# Patient Record
Sex: FEMALE | Race: WHITE | Employment: UNEMPLOYED | URBAN - METROPOLITAN AREA
[De-identification: names, ages, dates, MRNs, and addresses within clinical notes are randomized per-mention and may not be internally consistent; named-entity substitution may affect disease eponyms.]

---

## 2017-04-20 ENCOUNTER — HOSPITAL ENCOUNTER (EMERGENCY)
Age: 47
Discharge: HOME | End: 2017-04-20
Attending: FAMILY MEDICINE

## 2017-04-20 VITALS
OXYGEN SATURATION: 98 % | RESPIRATION RATE: 18 BRPM | HEART RATE: 94 BPM | HEIGHT: 65 IN | DIASTOLIC BLOOD PRESSURE: 83 MMHG | TEMPERATURE: 98.3 F | BODY MASS INDEX: 31.16 KG/M2 | SYSTOLIC BLOOD PRESSURE: 143 MMHG | WEIGHT: 187 LBS

## 2017-04-20 DIAGNOSIS — J20.9 ACUTE BRONCHITIS, UNSPECIFIED ORGANISM: Primary | ICD-10-CM

## 2017-04-20 RX ORDER — DOXYCYCLINE HYCLATE 100 MG
100 TABLET ORAL 2 TIMES DAILY
Qty: 20 TAB | Refills: 0 | Status: SHIPPED | OUTPATIENT
Start: 2017-04-20 | End: 2017-04-30

## 2017-04-20 RX ORDER — SUCRALFATE 1 G/1
1 TABLET ORAL 4 TIMES DAILY
COMMUNITY
End: 2017-09-01

## 2017-04-20 RX ORDER — CETIRIZINE HCL 10 MG
10 TABLET ORAL
COMMUNITY
End: 2017-09-01

## 2017-04-20 RX ORDER — BENZONATATE 200 MG/1
200 CAPSULE ORAL
Qty: 30 CAP | Refills: 0 | Status: SHIPPED | OUTPATIENT
Start: 2017-04-20 | End: 2017-09-01

## 2017-04-20 RX ORDER — PREDNISONE 20 MG/1
60 TABLET ORAL DAILY
Qty: 15 TAB | Refills: 0 | Status: SHIPPED | OUTPATIENT
Start: 2017-04-20 | End: 2017-04-25

## 2017-04-20 NOTE — UC PROVIDER NOTE
Patient is a 48 y.o. female presenting with cough. The history is provided by the patient. No  was used. Cough   This is a new problem. Episode onset: 5 days. The problem occurs constantly. The problem has been gradually worsening. The cough is non-productive. There has been no fever. Pertinent negatives include no chest pain, no chills, no sweats, no shortness of breath, no wheezing, no nausea and no vomiting. She has tried decongestants for the symptoms. The treatment provided no relief. She is a smoker. Her past medical history is significant for bronchitis. Her past medical history does not include pneumonia or asthma. Past Medical History:   Diagnosis Date    Arthritis     neck, back, hips & knees    Chronic fatigue     Colitis     Crohn's colitis ([address]     De Quervain's tenosynovitis, bilateral     Depression     Fibromyalgia     Patellofemoral syndrome of both knees     Pedal edema     Radial styloid fracture         Past Surgical History:   Procedure Laterality Date    HX WISDOM TEETH EXTRACTION      WI COLSC FLX W/NDSC US XM RCTM ET AL LMTD&ADJ STRUX           Family History   Problem Relation Age of Onset    Hypertension Mother     Cancer Father     Crohn's Disease Father     Crohn's Disease Brother     Cancer Paternal Aunt     Cancer Paternal Uncle         Social History     Social History    Marital status:      Spouse name: N/A    Number of children: N/A    Years of education: N/A     Occupational History    Not on file. Social History Main Topics    Smoking status: Current Every Day Smoker     Packs/day: 0.25     Years: 15.00    Smokeless tobacco: Never Used    Alcohol use No    Drug use: No    Sexual activity: Not on file     Other Topics Concern    Not on file     Social History Narrative                ALLERGIES: Review of patient's allergies indicates no known allergies. Review of Systems   Constitutional: Positive for fatigue. Negative for chills. HENT: Positive for congestion and sinus pressure. Eyes: Negative. Respiratory: Positive for cough. Negative for shortness of breath and wheezing. Cardiovascular: Negative. Negative for chest pain. Gastrointestinal: Negative. Negative for nausea and vomiting. Endocrine: Negative. Genitourinary: Negative. Musculoskeletal: Negative. Allergic/Immunologic: Negative. Neurological: Negative. Hematological: Negative. Psychiatric/Behavioral: Negative. Vitals:    04/20/17 1201   BP: 143/83   Pulse: 94   Resp: 18   Temp: 98.3 °F (36.8 °C)   SpO2: 98%   Weight: 84.8 kg (187 lb)   Height: 5' 4.5\" (1.638 m)       Physical Exam   Constitutional: She is oriented to person, place, and time. She appears well-developed and well-nourished. HENT:   Head: Normocephalic and atraumatic. Right Ear: External ear normal.   Left Ear: External ear normal.   Nose: Nose normal.   Mouth/Throat: Oropharynx is clear and moist.   Eyes: Conjunctivae and EOM are normal. Pupils are equal, round, and reactive to light. Neck: Normal range of motion. Neck supple. Cardiovascular: Normal rate, regular rhythm and normal heart sounds. Pulmonary/Chest: Effort normal and breath sounds normal. She has no wheezes. + bronchospastic cough   Musculoskeletal: Normal range of motion. Neurological: She is alert and oriented to person, place, and time. Skin: Skin is warm and dry. Psychiatric: She has a normal mood and affect. Her behavior is normal. Judgment and thought content normal.   Nursing note and vitals reviewed. MDM     Differential Diagnosis; Clinical Impression; Plan:     CLINICAL IMPRESSION:  Acute bronchitis, unspecified organism  (primary encounter diagnosis)    Plan:  1. Bronchitis- stop smoking  2. Take all prescribed medications as directed  3.  Follow up with PCP if no improvement  Risk of Significant Complications, Morbidity, and/or Mortality:   Presenting problems: Moderate  Diagnostic procedures:   Moderate  Progress:   Patient progress:  Stable      Procedures

## 2017-04-20 NOTE — DISCHARGE INSTRUCTIONS
Bronchitis: Care Instructions  Your Care Instructions    Bronchitis is inflammation of the bronchial tubes, which carry air to the lungs. The tubes swell and produce mucus, or phlegm. The mucus and inflamed bronchial tubes make you cough. You may have trouble breathing. Most cases of bronchitis are caused by viruses like those that cause colds. Antibiotics usually do not help and they may be harmful. Bronchitis usually develops rapidly and lasts about 2 to 3 weeks in otherwise healthy people. Follow-up care is a key part of your treatment and safety. Be sure to make and go to all appointments, and call your doctor if you are having problems. It's also a good idea to know your test results and keep a list of the medicines you take. How can you care for yourself at home? · Take all medicines exactly as prescribed. Call your doctor if you think you are having a problem with your medicine. · Get some extra rest.  · Take an over-the-counter pain medicine, such as acetaminophen (Tylenol), ibuprofen (Advil, Motrin), or naproxen (Aleve) to reduce fever and relieve body aches. Read and follow all instructions on the label. · Do not take two or more pain medicines at the same time unless the doctor told you to. Many pain medicines have acetaminophen, which is Tylenol. Too much acetaminophen (Tylenol) can be harmful. · Take an over-the-counter cough medicine that contains dextromethorphan to help quiet a dry, hacking cough so that you can sleep. Avoid cough medicines that have more than one active ingredient. Read and follow all instructions on the label. · Breathe moist air from a humidifier, hot shower, or sink filled with hot water. The heat and moisture will thin mucus so you can cough it out. · Do not smoke. Smoking can make bronchitis worse. If you need help quitting, talk to your doctor about stop-smoking programs and medicines. These can increase your chances of quitting for good.   When should you call for help? Call 911 anytime you think you may need emergency care. For example, call if:  · You have severe trouble breathing. Call your doctor now or seek immediate medical care if:  · You have new or worse trouble breathing. · You cough up dark brown or bloody mucus (sputum). · You have a new or higher fever. · You have a new rash. Watch closely for changes in your health, and be sure to contact your doctor if:  · You cough more deeply or more often, especially if you notice more mucus or a change in the color of your mucus. · You are not getting better as expected. Where can you learn more? Go to http://lamonte-maría.info/. Enter H333 in the search box to learn more about \"Bronchitis: Care Instructions. \"  Current as of: May 23, 2016  Content Version: 11.2  © 2772-1398 [email or web address]. Care instructions adapted under license by [employer] (which disclaims liability or warranty for this information). If you have questions about a medical condition or this instruction, always ask your healthcare professional. [address] any warranty or liability for your use of this information.

## 2017-07-18 ENCOUNTER — APPOINTMENT (OUTPATIENT)
Dept: CT IMAGING | Age: 47
End: 2017-07-18
Attending: PHYSICIAN ASSISTANT
Payer: MEDICAID

## 2017-07-18 ENCOUNTER — HOSPITAL ENCOUNTER (EMERGENCY)
Age: 47
Discharge: HOME | End: 2017-07-18
Attending: EMERGENCY MEDICINE
Payer: MEDICAID

## 2017-07-18 VITALS
HEART RATE: 97 BPM | SYSTOLIC BLOOD PRESSURE: 143 MMHG | BODY MASS INDEX: 31.28 KG/M2 | WEIGHT: 183.2 LBS | DIASTOLIC BLOOD PRESSURE: 83 MMHG | TEMPERATURE: 98.9 F | RESPIRATION RATE: 18 BRPM | HEIGHT: 64 IN | OXYGEN SATURATION: 97 %

## 2017-07-18 DIAGNOSIS — K52.9 INFLAMMATORY BOWEL DISEASE: ICD-10-CM

## 2017-07-18 DIAGNOSIS — N30.01 ACUTE CYSTITIS WITH HEMATURIA: ICD-10-CM

## 2017-07-18 DIAGNOSIS — R10.84 ABDOMINAL PAIN, GENERALIZED: Primary | ICD-10-CM

## 2017-07-18 LAB
ALBUMIN SERPL BCP-MCNC: 3.3 G/DL (ref 3.5–5)
ALBUMIN/GLOB SERPL: 0.8 {RATIO} (ref 1.1–2.2)
ALP SERPL-CCNC: 89 U/L (ref 45–117)
ALT SERPL-CCNC: 16 U/L (ref 12–78)
ANION GAP BLD CALC-SCNC: 3 MMOL/L (ref 5–15)
APPEARANCE UR: CLEAR
AST SERPL W P-5'-P-CCNC: 10 U/L (ref 15–37)
BACTERIA URNS QL MICRO: ABNORMAL /HPF
BASOPHILS # BLD AUTO: 0 K/UL (ref 0–0.1)
BASOPHILS # BLD: 0 % (ref 0–1)
BILIRUB SERPL-MCNC: 0.3 MG/DL (ref 0.2–1)
BILIRUB UR QL: NEGATIVE
BUN SERPL-MCNC: 9 MG/DL (ref 6–20)
BUN/CREAT SERPL: 11 (ref 12–20)
CALCIUM SERPL-MCNC: 8.6 MG/DL (ref 8.5–10.1)
CHLORIDE SERPL-SCNC: 104 MMOL/L (ref 97–108)
CO2 SERPL-SCNC: 29 MMOL/L (ref 21–32)
COLOR UR: ABNORMAL
CREAT SERPL-MCNC: 0.84 MG/DL (ref 0.55–1.02)
EOSINOPHIL # BLD: 0.2 K/UL (ref 0–0.4)
EOSINOPHIL NFR BLD: 1 % (ref 0–7)
EPITH CASTS URNS QL MICRO: ABNORMAL /LPF
ERYTHROCYTE [DISTWIDTH] IN BLOOD BY AUTOMATED COUNT: 12.4 % (ref 11.5–14.5)
GLOBULIN SER CALC-MCNC: 4.3 G/DL (ref 2–4)
GLUCOSE SERPL-MCNC: 80 MG/DL (ref 65–100)
GLUCOSE UR STRIP.AUTO-MCNC: NEGATIVE MG/DL
HCT VFR BLD AUTO: 37.7 % (ref 35–47)
HEMOCCULT STL QL: NEGATIVE
HGB BLD-MCNC: 13.2 G/DL (ref 11.5–16)
HGB UR QL STRIP: ABNORMAL
KETONES UR QL STRIP.AUTO: NEGATIVE MG/DL
LEUKOCYTE ESTERASE UR QL STRIP.AUTO: ABNORMAL
LYMPHOCYTES # BLD AUTO: 20 % (ref 12–49)
LYMPHOCYTES # BLD: 2.5 K/UL (ref 0.8–3.5)
MCH RBC QN AUTO: 30.3 PG (ref 26–34)
MCHC RBC AUTO-ENTMCNC: 35 G/DL (ref 30–36.5)
MCV RBC AUTO: 86.5 FL (ref 80–99)
MONOCYTES # BLD: 0.6 K/UL (ref 0–1)
MONOCYTES NFR BLD AUTO: 5 % (ref 5–13)
NEUTS SEG # BLD: 9.2 K/UL (ref 1.8–8)
NEUTS SEG NFR BLD AUTO: 74 % (ref 32–75)
NITRITE UR QL STRIP.AUTO: NEGATIVE
PH UR STRIP: 5.5 [PH] (ref 5–8)
PLATELET # BLD AUTO: 327 K/UL (ref 150–400)
POTASSIUM SERPL-SCNC: 3.4 MMOL/L (ref 3.5–5.1)
PROT SERPL-MCNC: 7.6 G/DL (ref 6.4–8.2)
PROT UR STRIP-MCNC: NEGATIVE MG/DL
RBC # BLD AUTO: 4.36 M/UL (ref 3.8–5.2)
RBC #/AREA URNS HPF: ABNORMAL /HPF (ref 0–5)
SODIUM SERPL-SCNC: 136 MMOL/L (ref 136–145)
SP GR UR REFRACTOMETRY: 1 (ref 1–1.03)
UA: UC IF INDICATED,UAUC: ABNORMAL
UROBILINOGEN UR QL STRIP.AUTO: 0.2 EU/DL (ref 0.2–1)
WBC # BLD AUTO: 12.5 K/UL (ref 3.6–11)
WBC URNS QL MICRO: ABNORMAL /HPF (ref 0–4)

## 2017-07-18 PROCEDURE — 74177 CT ABD & PELVIS W/CONTRAST: CPT

## 2017-07-18 PROCEDURE — 74011636320 HC RX REV CODE- 636/320: Performed by: PHYSICIAN ASSISTANT

## 2017-07-18 PROCEDURE — 96375 TX/PRO/DX INJ NEW DRUG ADDON: CPT

## 2017-07-18 PROCEDURE — 85025 COMPLETE CBC W/AUTO DIFF WBC: CPT

## 2017-07-18 PROCEDURE — 74011250636 HC RX REV CODE- 250/636: Performed by: EMERGENCY MEDICINE

## 2017-07-18 PROCEDURE — 74011250637 HC RX REV CODE- 250/637: Performed by: PHYSICIAN ASSISTANT

## 2017-07-18 PROCEDURE — 74011636320 HC RX REV CODE- 636/320: Performed by: EMERGENCY MEDICINE

## 2017-07-18 PROCEDURE — 36415 COLL VENOUS BLD VENIPUNCTURE: CPT

## 2017-07-18 PROCEDURE — 80053 COMPREHEN METABOLIC PANEL: CPT

## 2017-07-18 PROCEDURE — 99283 EMERGENCY DEPT VISIT LOW MDM: CPT

## 2017-07-18 PROCEDURE — 82272 OCCULT BLD FECES 1-3 TESTS: CPT

## 2017-07-18 PROCEDURE — 81001 URINALYSIS AUTO W/SCOPE: CPT

## 2017-07-18 PROCEDURE — 96374 THER/PROPH/DIAG INJ IV PUSH: CPT

## 2017-07-18 PROCEDURE — 87086 URINE CULTURE/COLONY COUNT: CPT

## 2017-07-18 PROCEDURE — 74011250636 HC RX REV CODE- 250/636: Performed by: PHYSICIAN ASSISTANT

## 2017-07-18 RX ORDER — DIPHENHYDRAMINE HYDROCHLORIDE 50 MG/ML
25 INJECTION, SOLUTION INTRAMUSCULAR; INTRAVENOUS
Status: COMPLETED | OUTPATIENT
Start: 2017-07-18 | End: 2017-07-18

## 2017-07-18 RX ORDER — ONDANSETRON 4 MG/1
4 TABLET, ORALLY DISINTEGRATING ORAL
Qty: 10 TAB | Refills: 0 | Status: SHIPPED | OUTPATIENT
Start: 2017-07-18 | End: 2017-09-01

## 2017-07-18 RX ORDER — CEPHALEXIN 250 MG/1
500 CAPSULE ORAL
Status: COMPLETED | OUTPATIENT
Start: 2017-07-18 | End: 2017-07-18

## 2017-07-18 RX ORDER — KETOROLAC TROMETHAMINE 30 MG/ML
30 INJECTION, SOLUTION INTRAMUSCULAR; INTRAVENOUS
Status: COMPLETED | OUTPATIENT
Start: 2017-07-18 | End: 2017-07-18

## 2017-07-18 RX ORDER — METOCLOPRAMIDE HYDROCHLORIDE 5 MG/ML
10 INJECTION INTRAMUSCULAR; INTRAVENOUS
Status: COMPLETED | OUTPATIENT
Start: 2017-07-18 | End: 2017-07-18

## 2017-07-18 RX ORDER — SODIUM CHLORIDE 9 MG/ML
50 INJECTION, SOLUTION INTRAVENOUS
Status: COMPLETED | OUTPATIENT
Start: 2017-07-18 | End: 2017-07-18

## 2017-07-18 RX ORDER — ONDANSETRON 2 MG/ML
4 INJECTION INTRAMUSCULAR; INTRAVENOUS ONCE
Status: COMPLETED | OUTPATIENT
Start: 2017-07-18 | End: 2017-07-18

## 2017-07-18 RX ORDER — CEPHALEXIN 500 MG/1
500 CAPSULE ORAL 3 TIMES DAILY
Qty: 21 CAP | Refills: 0 | Status: SHIPPED | OUTPATIENT
Start: 2017-07-18 | End: 2017-07-25

## 2017-07-18 RX ORDER — METOCLOPRAMIDE 10 MG/1
10 TABLET ORAL
Qty: 12 TAB | Refills: 0 | Status: SHIPPED | OUTPATIENT
Start: 2017-07-18 | End: 2017-07-28

## 2017-07-18 RX ORDER — PHENAZOPYRIDINE HYDROCHLORIDE 200 MG/1
200 TABLET, FILM COATED ORAL
Qty: 21 TAB | Refills: 0 | Status: SHIPPED | OUTPATIENT
Start: 2017-07-18 | End: 2017-09-01

## 2017-07-18 RX ORDER — SODIUM CHLORIDE 0.9 % (FLUSH) 0.9 %
10 SYRINGE (ML) INJECTION
Status: COMPLETED | OUTPATIENT
Start: 2017-07-18 | End: 2017-07-18

## 2017-07-18 RX ADMIN — Medication 10 ML: at 14:50

## 2017-07-18 RX ADMIN — IOPAMIDOL 100 ML: 755 INJECTION, SOLUTION INTRAVENOUS at 14:50

## 2017-07-18 RX ADMIN — CEPHALEXIN 500 MG: 250 CAPSULE ORAL at 17:02

## 2017-07-18 RX ADMIN — DIATRIZOATE MEGLUMINE AND DIATRIZOATE SODIUM 30 ML: 600; 100 SOLUTION ORAL; RECTAL at 13:30

## 2017-07-18 RX ADMIN — KETOROLAC TROMETHAMINE 30 MG: 30 INJECTION, SOLUTION INTRAMUSCULAR at 13:19

## 2017-07-18 RX ADMIN — METOCLOPRAMIDE 10 MG: 5 INJECTION, SOLUTION INTRAMUSCULAR; INTRAVENOUS at 13:19

## 2017-07-18 RX ADMIN — SODIUM CHLORIDE 50 ML/HR: 900 INJECTION, SOLUTION INTRAVENOUS at 14:50

## 2017-07-18 RX ADMIN — DIPHENHYDRAMINE HYDROCHLORIDE 25 MG: 50 INJECTION, SOLUTION INTRAMUSCULAR; INTRAVENOUS at 13:19

## 2017-07-18 RX ADMIN — ONDANSETRON 4 MG: 2 INJECTION INTRAMUSCULAR; INTRAVENOUS at 13:20

## 2017-07-18 NOTE — ED PROVIDER NOTES
HPI Comments: Flor Marquis is a 48 y.o. female with a PMH of ulcerative colitis and Crohn's disease presenting ambulatory to the ED from home C/O of left mid to lower abdominal pain x 3 weeks with associated nausea, diarrhea, and weight loss of 10 lbs over the last 2.5 weeks. Pt also complains of spasms in her left arm since last night and notes having vaginal discharge that \"looks like stool, but not for a couple of days\". She describes her stool as yellow with slight blood, and states she has 12-15 episodes of diarrhea per day. Her last BM was about half an hour ago. Pt states that this is her third week of her colitis flare up and had been seen at [facility] ED 4 days ago, but \"wasn't treated for anything\". She also states that her flare ups can range from about a couple of days to months and have \"almost killed (her) in the past\". She reports additional symptoms of chest wall pain, noting a prior hx of costochondritis for which current symptom is similar. Pt has not had any other recent abdominal surgeries, travel, abx, hospitalizations, or injuries. She specifically denies any vomiting, dysuria, hematuria, rashes or vaginal bleeding. Patient denies any other symptoms or complaints. PCP: Nadeem Campoverde MD  GI: Gaby Dewitt MD    There are no other complaints, changes or physical findings at this time. The history is provided by the patient. No  was used.         Past Medical History:   Diagnosis Date    Arthritis     neck, back, hips & knees    Chronic fatigue     Colitis     Crohn's colitis ([address]     De Quervain's tenosynovitis, bilateral     Depression     Fibromyalgia     Patellofemoral syndrome of both knees     Pedal edema     Radial styloid fracture        Past Surgical History:   Procedure Laterality Date    HX WISDOM TEETH EXTRACTION      VT COLSC FLX W/NDSC US XM RCTM ET AL LMTD&ADJ STRUX           Family History:   Problem Relation Age of Onset    Hypertension Mother     Cancer Father     Crohn's Disease Father     Crohn's Disease Brother     Cancer Paternal Aunt     Cancer Paternal Uncle        Social History     Social History    Marital status:      Spouse name: N/A    Number of children: N/A    Years of education: N/A     Occupational History    Not on file. Social History Main Topics    Smoking status: Current Every Day Smoker     Packs/day: 0.25     Years: 15.00    Smokeless tobacco: Never Used    Alcohol use No    Drug use: No    Sexual activity: Not on file     Other Topics Concern    Not on file     Social History Narrative         ALLERGIES: Review of patient's allergies indicates no known allergies. Review of Systems   Constitutional: Positive for unexpected weight change (weight loss). Negative for chills and fever. HENT: Negative for sore throat. Eyes: Negative for pain. Respiratory: Negative for cough and shortness of breath. Cardiovascular: Positive for chest pain (occasional, no recent change). Gastrointestinal: Positive for blood in stool, diarrhea and nausea. Negative for abdominal pain and vomiting. Genitourinary: Negative for dysuria and hematuria. Musculoskeletal: Negative for arthralgias and myalgias. Skin: Negative for rash. Neurological: Negative for dizziness, light-headedness, numbness and headaches. Psychiatric/Behavioral: Negative for behavioral problems and confusion. Physical Exam   Constitutional: She is oriented to person, place, and time. She appears well-developed and well-nourished. No distress. HENT:   Head: Normocephalic and atraumatic. Right Ear: External ear normal.   Left Ear: External ear normal.   Nose: Nose normal.   Eyes: Conjunctivae and EOM are normal.   Neck: Normal range of motion. Neck supple. Cardiovascular: Normal rate, regular rhythm, normal heart sounds and intact distal pulses. No murmur heard.   Pulmonary/Chest: Effort normal and breath sounds normal. She has no decreased breath sounds. She has no wheezes. Abdominal: Soft. Bowel sounds are normal. She exhibits no distension. There is tenderness in the right upper quadrant, epigastric area and suprapubic area. There is no guarding and no CVA tenderness (bilaterally). Musculoskeletal: Normal range of motion. She exhibits no edema or tenderness. Neurological: She is alert and oriented to person, place, and time. Skin: Skin is warm and dry. No rash noted. She is not diaphoretic. Psychiatric: She has a normal mood and affect. Her behavior is normal. Judgment normal.   Nursing note and vitals reviewed. MDM  Number of Diagnoses or Management Options  Abdominal pain, generalized:   Acute cystitis with hematuria:   Inflammatory bowel disease:   Diagnosis management comments: DDx: gastritis, gastroenteritis, diverticulitis, diverticulosis, ulcerative colitis flare, Crohn's disease, acute blood loss, anemia. Patient presents with diarrhea and abdominal pain for the past 2 weeks with history of UC/Crohns disease. Followed by Dr. Pau Donis (GI) with [facility]. Slight UTI found on lab work. Inflammation of the colon with no acute obstruction or abscess. Will discharge home and recommend close f/u with GI doctor. Amount and/or Complexity of Data Reviewed  Clinical lab tests: ordered and reviewed  Tests in the radiology section of CPT®: ordered and reviewed  Review and summarize past medical records: yes    Patient Progress  Patient progress: stable    ED Course       Procedures    Chief Complaint   Patient presents with    Abdominal Pain     left mid to lower denies vomiting    Headache     few days    Diarrhea     several days    Hand Pain     left hand has a small knot, unsure what caused knot. denies any injury       12:25 PM  The patients presenting problems have been discussed, and they are in agreement with the care plan formulated and outlined with them.   I have encouraged them to ask questions as they arise throughout their visit. MEDICATIONS GIVEN:  Medications   sodium chloride 0.9 % bolus infusion 1,000 mL (not administered)   cephALEXin (KEFLEX) capsule 500 mg (not administered)   ondansetron (ZOFRAN) injection 4 mg (4 mg IntraVENous Given 7/18/17 1320)   metoclopramide HCl (REGLAN) injection 10 mg (10 mg IntraVENous Given 7/18/17 1319)   diphenhydrAMINE (BENADRYL) injection 25 mg (25 mg IntraVENous Given 7/18/17 1319)   ketorolac (TORADOL) injection 30 mg (30 mg IntraVENous Given 7/18/17 1319)   diatrizoate meglumine-d.sodium (MD-GASTROVIEW,GASTROGRAFIN) 66-10 % contrast solution 30 mL (30 mL Oral Given 7/18/17 1330)   0.9% sodium chloride infusion (50 mL/hr IntraVENous New Bag 7/18/17 1450)   iopamidol (ISOVUE-370) 76 % injection 100 mL (100 mL IntraVENous Given 7/18/17 1450)   sodium chloride (NS) flush 10 mL (10 mL IntraVENous Given 7/18/17 1450)       LABS REVIEWED:  Recent Results (from the past 24 hour(s))   CBC WITH AUTOMATED DIFF    Collection Time: 07/18/17  1:00 PM   Result Value Ref Range    WBC 12.5 (H) 3.6 - 11.0 K/uL    RBC 4.36 3.80 - 5.20 M/uL    HGB 13.2 11.5 - 16.0 g/dL    HCT 37.7 35.0 - 47.0 %    MCV 86.5 80.0 - 99.0 FL    MCH 30.3 26.0 - 34.0 PG    MCHC 35.0 30.0 - 36.5 g/dL    RDW 12.4 11.5 - 14.5 %    PLATELET 148 657 - 195 K/uL    NEUTROPHILS 74 32 - 75 %    LYMPHOCYTES 20 12 - 49 %    MONOCYTES 5 5 - 13 %    EOSINOPHILS 1 0 - 7 %    BASOPHILS 0 0 - 1 %    ABS. NEUTROPHILS 9.2 (H) 1.8 - 8.0 K/UL    ABS. LYMPHOCYTES 2.5 0.8 - 3.5 K/UL    ABS. MONOCYTES 0.6 0.0 - 1.0 K/UL    ABS. EOSINOPHILS 0.2 0.0 - 0.4 K/UL    ABS.  BASOPHILS 0.0 0.0 - 0.1 K/UL   METABOLIC PANEL, COMPREHENSIVE    Collection Time: 07/18/17  1:00 PM   Result Value Ref Range    Sodium 136 136 - 145 mmol/L    Potassium 3.4 (L) 3.5 - 5.1 mmol/L    Chloride 104 97 - 108 mmol/L    CO2 29 21 - 32 mmol/L    Anion gap 3 (L) 5 - 15 mmol/L    Glucose 80 65 - 100 mg/dL    BUN 9 6 - 20 MG/DL Creatinine 0.84 0.55 - 1.02 MG/DL    BUN/Creatinine ratio 11 (L) 12 - 20      GFR est AA >60 >60 ml/min/1.73m2    GFR est non-AA >60 >60 ml/min/1.73m2    Calcium 8.6 8.5 - 10.1 MG/DL    Bilirubin, total 0.3 0.2 - 1.0 MG/DL    ALT (SGPT) 16 12 - 78 U/L    AST (SGOT) 10 (L) 15 - 37 U/L    Alk. phosphatase 89 45 - 117 U/L    Protein, total 7.6 6.4 - 8.2 g/dL    Albumin 3.3 (L) 3.5 - 5.0 g/dL    Globulin 4.3 (H) 2.0 - 4.0 g/dL    A-G Ratio 0.8 (L) 1.1 - 2.2     OCCULT BLOOD, STOOL    Collection Time: 07/18/17  1:25 PM   Result Value Ref Range    Occult blood, stool NEGATIVE  NEG     URINALYSIS W/ REFLEX CULTURE    Collection Time: 07/18/17  3:38 PM   Result Value Ref Range    Color YELLOW/STRAW      Appearance CLEAR CLEAR      Specific gravity 1.005 1.003 - 1.030      pH (UA) 5.5 5.0 - 8.0      Protein NEGATIVE  NEG mg/dL    Glucose NEGATIVE  NEG mg/dL    Ketone NEGATIVE  NEG mg/dL    Bilirubin NEGATIVE  NEG      Blood TRACE (A) NEG      Urobilinogen 0.2 0.2 - 1.0 EU/dL    Nitrites NEGATIVE  NEG      Leukocyte Esterase SMALL (A) NEG      WBC 20-50 0 - 4 /hpf    RBC 0-5 0 - 5 /hpf    Epithelial cells FEW FEW /lpf    Bacteria 1+ (A) NEG /hpf    UA:UC IF INDICATED URINE CULTURE ORDERED (A) CNI         VITAL SIGNS:  Patient Vitals for the past 12 hrs:   Temp Pulse Resp BP SpO2   07/18/17 1210 98.9 °F (37.2 °C) 97 18 143/83 97 %       RADIOLOGY RESULTS:  The following have been ordered and reviewed:    CT Results  (Last 48 hours)               07/18/17 1451  CT ABD PELV W CONT Final result    Impression:  IMPRESSION:        Thickening of the wall of the colon extending from the proximal transverse colon   through the rectum, consistent with the patient's history of inflammatory bowel   disease and colitis. No evidence of bowel obstruction. No abscess. .               Narrative:  EXAM: CT ABDOMEN PELVIS WITH CONTRAST   INDICATION:  Abdominal pain, history of Crohn's/colitis.    COMPARISON: No prior imaging studies of the abdomen. .   CONTRAST: 100 mL of Isovue-370. TECHNIQUE:    Multislice helical CT was performed from the diaphragm to the symphysis pubis   during uneventful rapid bolus intravenous contrast administration. Oral contrast   was not administered. Contiguous 5 mm axial images were reconstructed and lung   and soft tissue windows were generated. Coronal and sagittal reformations were   generated. CT dose reduction was achieved through use of a standardized protocol   tailored for this examination and automatic exposure control for dose   modulation. FINDINGS:   LOWER CHEST: The visualized portions of the lung bases are clear. ABDOMEN:   Liver: No focal lesions. Normal size and contour. Gallbladder and bile ducts: No gallstones. No biliary dilatation. Spleen: No abnormality. Pancreas: No abnormality. Adrenal glands: No abnormality. Kidneys: No abnormality. PELVIS:   Reproductive organs:  Unremarkable. Bladder: No abnormality. BOWEL AND MESENTERY: Stomach appears unremarkable. No small bowel   abnormalities. Diffuse thickening of the wall of the colon extending from the rectum to the   proximal transverse colon, consistent with the patient's history of inflammatory   bowel disease. Normal appearance of the descending colon and cecum. No   significant abnormalities of the terminal ileum. No evidence of bowel   obstruction or adjacent fluid collection. Mild stranding of the pericolic fat   particularly adjacent to the descending colon. No mesenteric mass or adenopathy. Appendix is nondistended. PERITONEUM: No ascites or free intraperitoneal air. RETROPERITONEUM: Aorta  tapers without aneurysm. No retroperitoneal adenopathy   or mass. No pelvic mass or adenopathy. BONES AND SOFT TISSUES: Sclerosis of the SI joints, right greater than left,   particularly on the iliac side, but with some involvement of the right posterior   sacrum.  Sacroiliitis versus osteitis condensans ilii. Bones otherwise   unremarkable. PROCEDURES:  Procedure Note - Rectal Exam:   12:39 PM  Performed by: Sheryl Yanes PA-C  Chaperoned by: Amadeo Dillon RN  Rectal exam performed. Light green stool was collected. Hemoccult card was sent to the lab to be tested  The procedure took 1-15 minutes, and pt tolerated well. Written by THOMAS Saldana, as dictated by Sheryl Yanes PA-C. PROGRESS NOTES:  12:25 PM  Initial assessment of patient complete, and patient was given the opportunity to ask questions. Plan of care reviewed with patient and will update when results are available. 4:45 PM  I have just reevaluated the patient. I have reviewed her vital signs and determined there is currently no worsening in their condition or physical exam. Results have been reviewed with them and their questions have been answered. 4:53 PM  I have reviewed discharge instructions with the patient and explained medications that she is being discharged with. The patient verbalized understanding and agrees with plan. DIAGNOSIS:    1. Abdominal pain, generalized    2. Inflammatory bowel disease    3. Acute cystitis with hematuria        PLAN:  1. Discharge home  2. Medications as directed  3. F/u with PCP in 2-3 days  4. Return precautions reviewed    Follow-up Information     Follow up With Details Comments Contact Info    Jason Schreiber MD Schedule an appointment as soon as possible for a visit      [facility]  As needed, If symptoms worsen [address] [place] [address]  [phone]        Current Discharge Medication List      START taking these medications    Details   ondansetron (ZOFRAN ODT) 4 mg disintegrating tablet Take 1 Tab by mouth every eight (8) hours as needed for Nausea. Qty: 10 Tab, Refills: 0      metoclopramide HCl (REGLAN) 10 mg tablet Take 1 Tab by mouth every six (6) hours as needed for Nausea for up to 10 days.   Qty: 12 Tab, Refills: 0      cephALEXin (KEFLEX) 500 mg capsule Take 1 Cap by mouth three (3) times daily for 7 days. Qty: 21 Cap, Refills: 0      phenazopyridine (PYRIDIUM) 200 mg tablet Take 1 Tab by mouth three (3) times daily as needed for Pain for up to 7 doses. Qty: 21 Tab, Refills: 0               ED COURSE: The patients hospital course has been uncomplicated. DISCHARGE NOTE:  4:55 PM  The care plan has been outline with the patient and/or family, who verbally conveyed understanding and agreement. Available results have been reviewed. Patient and/or family understand the follow up plan as outlined and discharge instructions. Should their condition deterioration at any time after discharge the patient agrees to return, follow up sooner than outlined or seek medical assistance at the closest Emergency Room as soon as possible. Questions have been answered.  Discharge instructions and educational information regarding the patient's diagnosis as well a list of reasons why the patient would want to seek immediate medical attention, should their condition change, were reviewed directly with the patient/family         This note will not be viewable in [facility]

## 2017-07-18 NOTE — DISCHARGE INSTRUCTIONS
Thank you for allowing us to provide you with excellent care today. We hope we addressed all of your concerns and needs. We strive to provide excellent quality care in the Emergency Department. Please rate us as excellent, as anything less than excellent does not meet our expectations. If you feel that you have not received excellent quality care or timely care, please ask to speak to the nurse manager. Please choose us in the future for your continued health care needs. The exam and treatment you received in the Emergency Department were for an urgent problem and are not intended as complete care. It is important that you follow-up with a doctor, nurse practitioner, or physician assistant to:  (1) confirm your diagnosis,  (2) re-evaluation of changes in your illness and treatment, and  (3) for ongoing care. If your symptoms become worse or you do not improve as expected and you are unable to reach your usual health care provider, you should return to the Emergency Department. We are available 24 hours a day. Take this sheet with you when you go to your follow-up visit. If you have any problem arranging the follow-up visit, contact [address] [place] [address] 21 [phone])    Make an appointment with your Primary Care doctor for follow up of this visit. Return to the ER if you are unable to be seen in the time recommended on your discharge instructions. Abdominal Pain: Care Instructions  Your Care Instructions    Abdominal pain has many possible causes. Some aren't serious and get better on their own in a few days. Others need more testing and treatment. If your pain continues or gets worse, you need to be rechecked and may need more tests to find out what is wrong. You may need surgery to correct the problem. Don't ignore new symptoms, such as fever, nausea and vomiting, urination problems, pain that gets worse, and dizziness. These may be signs of a more serious problem.   Your doctor may have recommended a follow-up visit in the next 8 to 12 hours. If you are not getting better, you may need more tests or treatment. The doctor has checked you carefully, but problems can develop later. If you notice any problems or new symptoms, get medical treatment right away. Follow-up care is a key part of your treatment and safety. Be sure to make and go to all appointments, and call your doctor if you are having problems. It's also a good idea to know your test results and keep a list of the medicines you take. How can you care for yourself at home? · Rest until you feel better. · To prevent dehydration, drink plenty of fluids, enough so that your urine is light yellow or clear like water. Choose water and other caffeine-free clear liquids until you feel better. If you have kidney, heart, or liver disease and have to limit fluids, talk with your doctor before you increase the amount of fluids you drink. · If your stomach is upset, eat mild foods, such as rice, dry toast or crackers, bananas, and applesauce. Try eating several small meals instead of two or three large ones. · Wait until 48 hours after all symptoms have gone away before you have spicy foods, alcohol, and drinks that contain caffeine. · Do not eat foods that are high in fat. · Avoid anti-inflammatory medicines such as aspirin, ibuprofen (Advil, Motrin), and naproxen (Aleve). These can cause stomach upset. Talk to your doctor if you take daily aspirin for another health problem. When should you call for help? Call 911 anytime you think you may need emergency care. For example, call if:  · You passed out (lost consciousness). · You pass maroon or very bloody stools. · You vomit blood or what looks like coffee grounds. · You have new, severe belly pain. Call your doctor now or seek immediate medical care if:  · Your pain gets worse, especially if it becomes focused in one area of your belly. · You have a new or higher fever.   · Your stools are black and look like tar, or they have streaks of blood. · You have unexpected vaginal bleeding. · You have symptoms of a urinary tract infection. These may include:  ¨ Pain when you urinate. ¨ Urinating more often than usual.  ¨ Blood in your urine. · You are dizzy or lightheaded, or you feel like you may faint. Watch closely for changes in your health, and be sure to contact your doctor if:  · You are not getting better after 1 day (24 hours). Where can you learn more? Go to http://lamonte-maría.info/. Enter J119 in the search box to learn more about \"Abdominal Pain: Care Instructions. \"  Current as of: March 20, 2017  Content Version: 11.3  © 8848-4269 [employer]. Care instructions adapted under license by [employer] (which disclaims liability or warranty for this information). If you have questions about a medical condition or this instruction, always ask your healthcare professional. [place] [address] any warranty or liability for your use of this information.

## 2017-07-20 LAB
BACTERIA SPEC CULT: NORMAL
CC UR VC: NORMAL
SERVICE CMNT-IMP: NORMAL

## 2017-09-01 ENCOUNTER — OFFICE VISIT (OUTPATIENT)
Dept: RHEUMATOLOGY | Age: 47
End: 2017-09-01

## 2017-09-01 VITALS
BODY MASS INDEX: 30.56 KG/M2 | RESPIRATION RATE: 18 BRPM | SYSTOLIC BLOOD PRESSURE: 156 MMHG | DIASTOLIC BLOOD PRESSURE: 101 MMHG | HEART RATE: 93 BPM | TEMPERATURE: 98.2 F | HEIGHT: 64 IN | WEIGHT: 179 LBS

## 2017-09-01 DIAGNOSIS — I10 ESSENTIAL HYPERTENSION: ICD-10-CM

## 2017-09-01 DIAGNOSIS — M79.7 FIBROMYALGIA: Primary | ICD-10-CM

## 2017-09-01 DIAGNOSIS — K52.9 INFLAMMATORY BOWEL DISEASES (IBD): ICD-10-CM

## 2017-09-01 DIAGNOSIS — M85.38 OSTEITIS CONDENSANS ILII: ICD-10-CM

## 2017-09-01 DIAGNOSIS — Z72.0 TOBACCO USE: ICD-10-CM

## 2017-09-01 NOTE — PATIENT INSTRUCTIONS
FIBROMYALGIA    Fibromyalgia is a disease characterized by chronic widespread musculoskeletal pain. Fibromyalgia is caused by abnormal processing of pain signals in the central nervous system, leading to exaggerated pain responses. There are functional MRI studies that have shown neuroplasticity (re-wiring) of the pain pathways in the brain. Physical and Mental Exercise    The mainstay of therapy includes non-pharmacologic therapies such as cardiovascular exercise and Cognitive Behavioral Therapy which have been shown to be of benefit ([facility], Am J Med 2009). Juan Jose Chi in particular has proven efficacy in the treatment of fibromyalgia Radha Che al. [facility] 2010; [phone]). Performing at least 60 minutes per day of Marsha Dukes has been shown to improve pain without medical management. Medications    After discussing with your primary care and if medications are pursued, pregabalin (Lyrica), gabapentin (Neurontin), milnacipran (Carmie Schooling), and duloxetine (Cymbalta) are FDA approved medications for the treatment of fibromyalgia. Narcotic Pain Medications Are BAD    Narcotics, such as oxycodone, hydrocodone, morphine, dilaudid, or codeine, have not been proven to be efficacious in the treatment of fibromyalgia. In fact, narcotic use in this patient population has been observed to exacerbate depression, and may enhance the hyperalgesia which is characteristic of this condition ([facility] 2006). They also are at increased risk for opioid-induced hyperalgesia due predominantly to central sensitization Lo Dutta al. [facility] Clin Rheumatol. 2013 Mar;19(2):72-7). Specifically, a double-blind placebo-controlled trial by Abram Brasher al published in 1995 demonstrated that intravenous morphine did not reduce pain in fibromyalgia patients.  A study by Ashley Peoples al published in 2003 showed that fibromyalgia patients taking oral opiates did not experience improvement in their pain at four years of follow up, and also reported increased depression over the last two years of the study. There is subsequent concern that the prolonged use of narcotics to treat fibromyalgia may cause harm to these patients Pal Stahl, Pain 2005). Finally, opioid use in fibromyalgia had poorer symptoms and functional and occupational status compared to nonusers (Christopher POLANCO et al. Pain Res Treat. 8901;7076:164422). Therefore, I recommend that narcotics be avoided in all patients with fibromyalgia. My Recommendations    I recommend Juan Jose Chi stretching exercises for at least 30 minutes per day. The [employer] has made a videotape of Juan Jose Chi that you can borrow from [employer], purchase online or watch for free on [employer]. com Juan Jose Chi for Arthritis. I strongly recommend you to join a gym that has an indoor pool, to do aqua therapy and Juan Jose Chi classes. Resources include: [employer], [employer], [employer], and the [facility]. We discussed treating secondary causes, such as sleep apnea, poor sleep quality, depression, anxiety weight loss, vitamin deficiencies, such as vitamin D, and pursuing aquatherapy.  I encourage you to do [address].

## 2017-09-01 NOTE — PROGRESS NOTES
REASON FOR VISIT    This is an evaluation for Ms. Erlinda Mathews a 48 y.o.  female for diffuse pain. The patient is self-referred to the Arthritis and [address] [place] [address]. HISTORY OF PRESENT ILLNESS     I have reviewed and summarized old records from [facility]    She has a history of ulcerative colitis and Crohn's and fibromyalgia. She is on Remicade every 8 weeks. She feels stiff in the morning like \"being in a vice \". She has trouble climbing stairs. She has pain in her neck and shoulders. Her outer hips hurt at night. She is awake when she moves at night. She has pain in her wrists and ankles. She has pain in her chest with deep breaths that is intermittent. In 7/18/2017, CT ABdomen and Pelvis with contrast showed LOWER CHEST: The visualized portions of the lung bases are clear. ABDOMEN: Liver: No focal lesions. Normal size and contour. Gallbladder and bile ducts: No gallstones. No biliary dilatation. Spleen: No abnormality. Pancreas: No abnormality. Adrenal glands: No abnormality. Kidneys: No abnormality. PELVIS:Reproductive organs:  Unremarkable. Bladder: No abnormality. BOWEL AND MESENTERY: Stomach appears unremarkable. No small bowel abnormalities. Diffuse thickening of the wall of the colon extending from the rectum to the proximal transverse colon, consistent with the patient's history of inflammatory bowel disease. Normal appearance of the descending colon and cecum. No significant abnormalities of the terminal ileum. No evidence of bowel obstruction or adjacent fluid collection. Mild stranding of the pericolic fat particularly adjacent to the descending colon. No mesenteric mass or adenopathy. Appendix is nondistended. PERITONEUM: No ascites or free intraperitoneal air. RETROPERITONEUM: Aorta  tapers without aneurysm. No retroperitoneal adenopathy or  mass. No pelvic mass or adenopathy.  BONES AND SOFT TISSUES: Sclerosis of the SI joints, right greater than left, particularly on the iliac side, but with some involvement of the right posterior sacrum. Sacroiliitis versus osteitis condensans ilii. Bones otherwise unremarkable. Her mother has psoriasis and father as Crohn's Disease and Ulcerative Colitis in her brother. In 7/18/2017, labs showed WBC 12.5, lymphocytes 2.5, Hct 37.7%, platelets 468,719, creatinine 0.84 mg/dL, eGFR >60, albumin 3.3 g/dL, ALK 89 U/L, ALT 16 U/L, AST 10 U/L. Today, she complains of pain from her neck to back. The pain is sometimes stabbing and throbbing. It feels like she's a vice  around her chest. No known relieving factors. Activities makes it worse. She has seen Physic la therapy which does not help. She has  1year old. andersedilson. Her last Remicade was more than 3 months ago because she was sick. Her last colonoscopy was 3/2017 showed active disease. She had been on Remicade for at least one year. She did not have any improvement of her back pain while on Remicade. She smokes and is cutting down and is down to a quarter pack of day. REVIEW OF SYSTEMS    A 15 point review of systems was performed and summarized below. The questionnaire was reviewed with the patient and scanned into the patient's medical record.     General: endorses fatigue, fever, night sweats, denies recent weight gain, recent weight loss, weakness  Musculoskeletal: endorses joint pain, morning stiffness (lasting 120 minutes), denies joint swelling, muscle weakness  Ears: denies ringing in ears, loss of hearing, deafness  Eyes: denies pain, redness, loss of vision, double vision, blurred vision, dryness, foreign body sensation  Mouth: endorses increased dental caries, denies sore tongue, oral ulcers, bleeding gums, loss of taste, dryness  Nose: denies nosebleeds, loss of smell, nasal ulcers  Throat: denies frequent sore throats, hoarseness, difficulty in swallowing, pain in jaw while chewing  Neck: denies swollen glands, tender glands  Cardiopulmonary: endorses pain in chest, swollen legs or feet, denies irregular heart beat, sudden changes in heart beat, shortness of breath, difficulty breathing at night, cough, coughing of blood, wheezing  Gastrointestinal: denies nausea, heartburn, stomach pain relieved by food, vomiting of blood/\"coffee grounds\", jaundice, increasing constipation, persistent diarrhea, blood in stools, black stools  Genitourinary: denies getting up at night to pass urine, difficult urination, pain or burning on urination, blood in urine, cloudy urine, pus in urine, genital discharge, frequent urination, vaginal dryness, rash/ulcers, sexual difficulties   Hematologic: denies anemia, bleeding tendency, blood clots  Skin: endorses hair loss, denies easy bruising, redness, rash, hives, sun sensitive, skin tightness, nodules/bumps, color changes of hands or feet in the cold (Raynaud's)  Neurologic: endorses headaches, denies dizziness, fainting or loss of consciousness, numbness or tingling in hands/feet, memory loss, muscle weakness  Psychiatric: endorses depression, denies excessive worries  Sleep: endorses poor sleep (4-5 hours), snoring, daytime somnolence, difficulty staying asleep , denies difficulty falling asleep    PAST MEDICAL HISTORY    She has a past medical history of Arthritis; Chronic fatigue; Colitis; Crohn's colitis ([address] De Quervain's tenosynovitis, bilateral; Depression; Fibromyalgia; Patellofemoral syndrome of both knees; Pedal edema; and Radial styloid fracture. FAMILY HISTORY    Her family history includes Cancer in her father, paternal aunt, and paternal uncle; Crohn's Disease in her brother and father; Hypertension in her mother; Psoriasis in her mother. SOCIAL HISTORY    She reports that she has been smoking. She has a 3.75 pack-year smoking history. She has never used smokeless tobacco. She reports that she does not drink alcohol or use illicit drugs.     HEALTH MAINTENANCE    Immunizations  Immunization History Administered Date(s) Administered    Hep A and Hep B Vaccine 04/24/2015    Pneumococcal Polysaccharide (PPSV-23) 04/24/2015    Tdap 04/21/2014       Age Appropriate Cancer Screening    Colonoscopy: 2017  PAP Smear: 2015  Mammogram: 2016    MEDICATIONS    Current Outpatient Prescriptions   Medication Sig Dispense Refill    inFLIXimab (REMICADE) 100 mg injection 5 mg/kg by IntraVENous route every two (2) months.  omeprazole (PRILOSEC) 40 mg capsule Take 40 mg by mouth daily.  FLUoxetine (PROZAC) 20 mg capsule Take 20 mg by mouth three (3) times daily. ALLERGIES    No Known Allergies    PHYSICAL EXAMINATION    Visit Vitals    BP (!) 156/101 (BP 1 Location: Left arm, BP Patient Position: Sitting)    Pulse 93    Temp 98.2 °F (36.8 °C)    Resp 18    Ht 5' 4\" (1.626 m)    Wt 179 lb (81.2 kg)    BMI 30.73 kg/m2     Body mass index is 30.73 kg/(m^2). General: Patient is alert, oriented x 3, not in acute distress    HEENT:   Conjunctiva are not injected and appear moist, oral mucous membranes are moist, there are no ulcers present, there is no alopecia, neck is supple, there is no lymphadenopathy. Salivary glands are normal    Cardiovascular:  Heart is regular rate and rhythm, no murmurs. Chest:  Lungs are clear to auscultation bilaterally. Abdomen:  Soft, non-tender     Extremities:  Free of clubbing, cyanosis, edema, extremities well perfused. Neurological exam:  No focal sensory deficits, muscle strength is full in upper and lower extremities. Skin exam:  There are no rashes, no psoriasis, no active Raynaud's, no livedo reticularis, no periungual erythema. Musculoskeletal exam:  A comprehensive musculoskeletal exam was performed for all joints of each upper and lower extremity and assessed for swelling, tenderness and range of motion.      14/18 tender points  Bilateral knee crepitus without effusions  Bilateral Achilles tendon tenderness  Normal spine flexion     DATA REVIEW    Studies Reviewed:     Prior medical records were reviewed and are summarized as below:    Laboratory data: summarized in the HPI    Imaging: summarized in the HPI. ASSESSMENT AND PLAN    1) Fibromyalgia. Fibromyalgia is a disease characterized by chronic widespread musculoskeletal pain. Fibromyalgia is caused by abnormal processing of pain signals in the central nervous system, leading to exaggerated pain responses. Non-pharmacologic therapies such as cardiovascular exercise and Cognitive Behavioral Therapy have been shown to be of benefit ([address] [facility], Am J Med 2009). Juan Jose Chi in particular has proven efficacy in the treatment of fibromyalgia SUMMER Eason 2010). If pharmacotherapy is pursued, pregabalin (Lyrica), gabapentin (Neurontin), milnacipran (Pato Bogus), and duloxetine (Cymbalta) are FDA approved medications for the treatment of fibromyalgia. Narcotics have not been proven to be efficacious in the treatment of fibromyalgia. In fact, narcotic use in this patient population has been observed to exacerbate depression, and may enhance the hyperalgesia which is characteristic of this condition ([facility] [facility] 2006). They also are at increased risk for opioid-induced hyperalgesia due predominantly to central sensitization Mohit Dutta al. [facility] Clin Rheumatol. 2013 Mar;19(2):72-7). Specifically, a double-blind placebo-controlled trial by Tila Henry al published in 1995 demonstrated that intravenous morphine did not reduce pain in fibromyalgia patients. A study by Kelly Keith al published in 2003 showed that fibromyalgia patients taking oral opiates did not experience improvement in their pain at four years of follow up, and also reported increased depression over the last two years of the study. There is subsequent concern that the prolonged use of narcotics to treat fibromyalgia may cause harm to these patients Deandre Davis, Pain 2005).  Finally, opioid use in fibromyalgia had poorer symptoms and functional and occupational status compared to nonusers (Christopher POLANCO et al. Pain Res Treat. 7464;1631:568560). We therefore recommend that narcotics be avoided in all patients with fibromyalgia. We recommend Juan Jose Chi stretching exercises for at least 30 minutes per day. The [employer] has made a videotape of Juan Jose Chi that She can borrow from [employer], purchase online or watch for free on [employer]. com Juan Jose Chi for Arthritis. We discussed treating secondary causes, such as sleep apnea, poor sleep quality, depression, anxiety, weight loss, vitamin deficiencies, such as vitamin D, and pursuing aquatherapy. I encouraged her to do [address]. My recommendations were provided to her and she was informed to follow up with her primary care physician for further management of her fibromyalgia. 2) Crohn's Disease and Ulcerative Colitis. This is an active issue. 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 She has normal flexion, so less likely sacroiliitis. This also did not improve with Remicade, so less likely an inflammatory disease. She follows with GI. 3) Tobacco Use. She is down to quarter pack today. She was informed about smoking and worsening inflammatory bowel disease. 4) Hypertension. Her blood pressure was 156/101. She is not on an anti-HTN. The patient voiced understanding of the aforementioned assessment and plan. Summary of plan was provided in the After Visit Summary patient instructions. I also provided education about MyChart setup and utility. TODAY'S ORDERS    No orders of the defined types were placed in this encounter. No future appointments.     Vicente Watkins MD, [address] [facility]    Adult Rheumatology   Musculoskeletal Ultrasound Certified  [address] [facility] [address], [address], [address]   Phone [phone]  Fax [phone]

## 2017-09-01 NOTE — MR AVS SNAPSHOT
Visit Information Date & Time Provider Department Dept. Phone Encounter #  
 9/1/2017 11:00 AM Brent Guillen, Belen Bain [phone] Upcoming Health Maintenance Date Due  
 PAP AKA CERVICAL CYTOLOGY 2/20/2017 INFLUENZA AGE 9 TO ADULT 8/1/2017 DTaP/Tdap/Td series (2 - Td) 4/21/2024 Allergies as of 9/1/2017  Review Complete On: 9/1/2017 By: Brent Guillen MD  
 No Known Allergies Current Immunizations  Never Reviewed Name Date Hep A and Hep B Vaccine 4/24/2015 Pneumococcal Polysaccharide (PPSV-23) 4/24/2015 Tdap 4/21/2014 Not reviewed this visit You Were Diagnosed With   
  
 Codes Comments Fibromyalgia    -  Primary ICD-10-CM: M79.7 ICD-9-CM: 729.1 Vitals BP Pulse Temp Resp Height(growth percentile) Weight(growth percentile) (!) 156/101 (BP 1 Location: Left arm, BP Patient Position: Sitting) 93 98.2 °F (36.8 °C) 18 5' 4\" (1.626 m) 179 lb (81.2 kg) BMI OB Status Smoking Status 30.73 kg/m2 Having regular periods Current Every Day Smoker BMI and BSA Data Body Mass Index Body Surface Area 30.73 kg/m 2 1.91 m 2 Preferred Pharmacy Pharmacy Name Phone Natasha Guidry [address], [address] [place] [address] Avenue [phone] Your Updated Medication List  
  
   
This list is accurate as of: 9/1/17 11:40 AM.  Always use your most recent med list.  
  
  
  
  
 FLUoxetine 20 mg capsule Commonly known as:  PROzac Take 20 mg by mouth three (3) times daily. omeprazole 40 mg capsule Commonly known as:  PRILOSEC Take 40 mg by mouth daily. REMICADE 100 mg injection Generic drug:  inFLIXimab  
5 mg/kg by IntraVENous route every two (2) months. Patient Instructions FIBROMYALGIA Fibromyalgia is a disease characterized by chronic widespread musculoskeletal pain. Fibromyalgia is caused by abnormal processing of pain signals in the central nervous system, leading to exaggerated pain responses. There are functional MRI studies that have shown neuroplasticity (re-wiring) of the pain pathways in the brain. Physical and Mental Exercise The mainstay of therapy includes non-pharmacologic therapies such as cardiovascular exercise and Cognitive Behavioral Therapy which have been shown to be of benefit ([facility], Am J Med 2009). Juan Jose Chi in particular has proven efficacy in the treatment of fibromyalgia Wild Camejo et al. [facility] J Med 2010; 110:365-219). Performing at least 60 minutes per day of Jerrica Zach has been shown to improve pain without medical management. Medications After discussing with your primary care and if medications are pursued, pregabalin (Lyrica), gabapentin (Neurontin), milnacipran (Kerry Pinna), and duloxetine (Cymbalta) are FDA approved medications for the treatment of fibromyalgia. Narcotic Pain Medications Are BAD Narcotics, such as oxycodone, hydrocodone, morphine, dilaudid, or codeine, have not been proven to be efficacious in the treatment of fibromyalgia. In fact, narcotic use in this patient population has been observed to exacerbate depression, and may enhance the hyperalgesia which is characteristic of this condition (Negrita Bourgeois Rheum 2006). They also are at increased risk for opioid-induced hyperalgesia due predominantly to central sensitization Jean COOPER et al. [facility] Clin Rheumatol. 2013 Mar;19(2):72-7). Specifically, a double-blind placebo-controlled trial by Conception Eaton al published in 1995 demonstrated that intravenous morphine did not reduce pain in fibromyalgia patients.  A study by Trey Henao al published in 2003 showed that fibromyalgia patients taking oral opiates did not experience improvement in their pain at four years of follow up, and also reported increased depression over the last two years of the study. There is subsequent concern that the prolonged use of narcotics to treat fibromyalgia may cause harm to these patients Shonda Dante, Pain 2005). Finally, opioid use in fibromyalgia had poorer symptoms and functional and occupational status compared to nonusers (Christopher POLANCO et al. Pain Res Treat. 4219;5905:911171). Therefore, I recommend that narcotics be avoided in all patients with fibromyalgia. My Recommendations I recommend Juan Jose Chi stretching exercises for at least 30 minutes per day. The [employer] has made a videotape of Juan Jose Chi that you can borrow from [employer], purchase online or watch for free on [employer] Juan Jose Chi for Arthritis. I strongly recommend you to join a gym that has an indoor pool, to do aqua therapy and Juan Jose Chi classes. Resources include: [employer], [employer], and the [employer]. We discussed treating secondary causes, such as sleep apnea, poor sleep quality, depression, anxiety weight loss, vitamin deficiencies, such as vitamin D, and pursuing aquatherapy. I encourage you to do Aurelia Imus. Introducing [facility]! Kecia Dos Santos introduces [employer] patient portal. Now you can access parts of your medical record, email your doctor's office, and request medication refills online. 1. In your internet browser, go to [email or web address] 2. Click on the First Time User? Click Here link in the Sign In box. You will see the New Member Sign Up page. 3. Enter your [employer] Access Code exactly as it appears below. You will not need to use this code after youve completed the sign-up process. If you do not sign up before the expiration date, you must request a new code. · [employer] Access Code: [email or web address] Expires: 11/30/2017 11:40 AM 
 
4. Enter the last four digits of your Social Security Number (xxxx) and Date of Birth (mm/dd/yyyy) as indicated and click Submit. You will be taken to the next sign-up page. 5. Create a Move[employer] ID. This will be your [employer] login ID and cannot be changed, so think of one that is secure and easy to remember. 6. Create a [employer] password. You can change your password at any time. 7. Enter your Password Reset Question and Answer. This can be used at a later time if you forget your password. 8. Enter your e-mail address. You will receive e-mail notification when new information is available in [address] 9. Click Sign Up. You can now view and download portions of your medical record. 10. Click the Download Summary menu link to download a portable copy of your medical information. If you have questions, please visit the Frequently Asked Questions section of the [employer] website. Remember, [employer] is NOT to be used for urgent needs. For medical emergencies, dial 911. Now available from your iPhone and Android! Please provide this summary of care documentation to your next provider. Your primary care clinician is listed as [address] If you have any questions after today's visit, please call [phone].

## 2018-02-07 ENCOUNTER — OFFICE VISIT (OUTPATIENT)
Dept: INTERNAL MEDICINE CLINIC | Age: 48
End: 2018-02-07

## 2018-02-07 VITALS
DIASTOLIC BLOOD PRESSURE: 90 MMHG | OXYGEN SATURATION: 96 % | RESPIRATION RATE: 18 BRPM | HEART RATE: 79 BPM | WEIGHT: 180.6 LBS | HEIGHT: 64 IN | SYSTOLIC BLOOD PRESSURE: 134 MMHG | BODY MASS INDEX: 30.83 KG/M2 | TEMPERATURE: 98 F

## 2018-02-07 DIAGNOSIS — M25.562 ACUTE PAIN OF LEFT KNEE: Primary | ICD-10-CM

## 2018-02-07 DIAGNOSIS — F41.8 DEPRESSION WITH ANXIETY: ICD-10-CM

## 2018-02-07 RX ORDER — AZATHIOPRINE 50 MG/1
50 TABLET ORAL DAILY
COMMUNITY
End: 2018-10-23 | Stop reason: ALTCHOICE

## 2018-02-07 RX ORDER — DICLOFENAC SODIUM 10 MG/G
2 GEL TOPICAL 4 TIMES DAILY
Qty: 100 G | Refills: 0 | Status: SHIPPED | OUTPATIENT
Start: 2018-02-07 | End: 2019-10-21

## 2018-02-07 RX ORDER — FLUOXETINE HYDROCHLORIDE 20 MG/1
40 CAPSULE ORAL DAILY
Qty: 60 CAP | Refills: 3 | Status: SHIPPED | OUTPATIENT
Start: 2018-02-07 | End: 2018-06-18 | Stop reason: ALTCHOICE

## 2018-02-07 NOTE — PROGRESS NOTES
HISTORY OF PRESENT ILLNESS  Kenny Swan is a 50 y.o. female presents for acute care  HPI  Hx of  torn meniscus left knee. Knee gives out, fell two weeks ago. Swelling worse at end of day. Requests referral to orthopaedist. Denies unusual activity. Off Prozac for 3 months, depressed mood. Requests to restart medication      Past Medical History:   Diagnosis Date    Arthritis     neck, back, hips & knees    Chronic fatigue     Colitis     Crohn's colitis ([address]     De Quervain's tenosynovitis, bilateral     Depression     Fibromyalgia     Patellofemoral syndrome of both knees     Pedal edema     Radial styloid fracture        Current Outpatient Prescriptions on File Prior to Visit   Medication Sig Dispense Refill    omeprazole (PRILOSEC) 40 mg capsule Take 40 mg by mouth daily.  inFLIXimab (REMICADE) 100 mg injection 5 mg/kg by IntraVENous route every two (2) months. No current facility-administered medications on file prior to visit. Review of Systems   Constitutional: Negative. HENT: Negative. Eyes: Negative. Cardiovascular: Negative. Gastrointestinal: Negative. Genitourinary: Negative. Musculoskeletal: Positive for falls and joint pain. Skin: Negative. Psychiatric/Behavioral: Positive for depression. Negative for substance abuse and suicidal ideas. The patient is nervous/anxious and has insomnia. Physical Exam   Constitutional: She is oriented to person, place, and time. She appears well-developed and well-nourished. No distress. Cardiovascular: Normal rate and regular rhythm. Pulmonary/Chest: Effort normal and breath sounds normal.   Musculoskeletal: She exhibits no deformity. Left knee: She exhibits no swelling and no effusion. Tenderness found. Neurological: She is alert and oriented to person, place, and time. Skin: Skin is warm and dry. She is not diaphoretic. Psychiatric: She has a normal mood and affect.  Her behavior is normal. Judgment and thought content normal.       ASSESSMENT and PLAN    ICD-10-CM ICD-9-CM    1. Acute pain of left knee M25.562 719.46 REFERRAL TO ORTHOPEDIC SURGERY      diclofenac (VOLTAREN) 1 % gel   2. Depression with anxiety F41.8 300.4 FLUoxetine (PROZAC) 20 mg capsule     Follow-up Disposition: Not on File  lab results and schedule of future lab studies reviewed with patient  reviewed diet, exercise and weight control  reviewed medications and side effects in detail    Patient voices understanding and acceptance of this advice and will call back if any further questions or concerns. An After Visit Summary was printed and given to the patient.

## 2018-02-07 NOTE — PATIENT INSTRUCTIONS
Meniscus Tear: Exercises  Your Care Instructions  Here are some examples of typical rehabilitation exercises for your condition. Start each exercise slowly. Ease off the exercise if you start to have pain. Your doctor or physical therapist will tell you when you can start these exercises and which ones will work best for you. How to do the exercises  Calf wall stretch    1. Stand facing a wall with your hands on the wall at about eye level. Put your affected leg about a step behind your other leg. 2. Keeping your back leg straight and your back heel on the floor, bend your front knee and gently bring your hip and chest toward the wall until you feel a stretch in the calf of your back leg. 3. Hold the stretch for at least 15 to 30 seconds. 4. Repeat 2 to 4 times. 5. Repeat steps 1 through 4, but this time keep your back knee bent. Hamstring wall stretch    1. Lie on your back in a doorway, with your good leg through the open door. 2. Slide your affected leg up the wall to straighten your knee. You should feel a gentle stretch down the back of your leg. 1. Do not arch your back. 2. Do not bend either knee. 3. Keep one heel touching the floor and the other heel touching the wall. Do not point your toes. 3. Hold the stretch for at least 1 minute. Then over time, try to lengthen the time you hold the stretch to as long as 6 minutes. 4. Repeat 2 to 4 times. 5. If you do not have a place to do this exercise in a doorway, there is another way to do it:  6. Lie on your back, and bend your affected leg. 7. Loop a towel under the ball and toes of that foot, and hold the ends of the towel in your hands. 8. Straighten your knee, and slowly pull back on the towel. You should feel a gentle stretch down the back of your leg. 9. Hold the stretch for at least 15 to 30 seconds. Or even better, hold the stretch for 1 minute if you can. 10. Repeat 2 to 4 times. Quad sets    1.  Sit with your leg straight and supported on the floor or a firm bed. (If you feel discomfort in the front or back of your knee, place a small towel roll under your knee.)  2. Tighten the muscles on top of your thigh by pressing the back of your knee flat down to the floor. (If you feel discomfort under your kneecap, place a small towel roll under your knee.)  3. Hold for about 6 seconds, then rest up to 10 seconds. 4. Do 8 to 12 repetitions several times a day. Straight-leg raises to the front    1. Lie on your back with your good knee bent so that your foot rests flat on the floor. Your injured leg should be straight. Make sure that your low back has a normal curve. You should be able to slip your flat hand in between the floor and the small of your back, with your palm touching the floor and your back touching the back of your hand. 2. Tighten the thigh muscles in the injured leg by pressing the back of your knee flat down to the floor. Hold your knee straight. 3. Keeping the thigh muscles tight, lift your injured leg up so that your heel is about 12 inches off the floor. Hold for 5 seconds and then lower slowly. 4. Do 8 to 12 repetitions. Straight-leg raises to the back    1. Lie on your stomach, and lift your leg straight up behind you (toward the ceiling). 2. Lift your toes about 6 inches off the floor, hold for about 6 seconds, then lower slowly. 3. Do 8 to 12 repetitions. Hamstring curls    1. Lie on your stomach with your knees straight. If your kneecap is uncomfortable, roll up a washcloth and put it under your leg just above your kneecap. 2. Lift the foot of your injured leg by bending the knee so that you bring the foot up toward your buttock. If this motion hurts, try it without bending your knee quite as far. This may help you avoid any painful motion. 3. Slowly lower your leg back to the floor. 4. Do 8 to 12 repetitions.   5. With permission from your doctor or physical therapist, you may also want to add a cuff weight to your ankle (not more than 5 pounds). With weight, you do not have to lift your leg more than 12 inches to get a hamstring workout. Wall slide with ball squeeze    1. Stand with your back against a wall and with your feet about shoulder-width apart. Your feet should be about 12 inches away from the wall. 2. Put a ball about the size of a soccer ball between your knees. Then slowly slide down the wall until your knees are bent about 20 to 30 degrees. 3. Tighten your thigh muscles by squeezing the ball between your knees. Hold that position for about 10 seconds, then stop squeezing. Rest for up to 10 seconds between repetitions. 4. Repeat 8 to 12 times. Heel raises    1. Stand with your feet a few inches apart, with your hands lightly resting on a counter or chair in front of you. 2. Slowly raise your heels off the floor while keeping your knees straight. 3. Hold for about 6 seconds, then slowly lower your heels to the floor. 4. Do 8 to 12 repetitions several times during the day. Heel dig bridging    Stop doing this exercise if it causes pain. 1. Lie on your back with both knees bent and your ankles bent so that only your heels are digging into the floor. Your knees should be bent about 90 degrees. 2. Then push your heels into the floor, squeeze your buttocks, and lift your hips off the floor until your shoulders, hips, and knees are all in a straight line. 3. Hold for about 6 seconds as you continue to breathe normally, and then slowly lower your hips back down to the floor and rest for up to 10 seconds. 4. Do 8 to 12 repetitions. Shallow standing knee bends    Do this exercise only if you have very little pain; if you have no clicking, locking, or giving way in the injured knee; and if it does not hurt while you are doing 8 to 12 repetitions. 1. Stand with your hands lightly resting on a counter or chair in front of you. Put your feet shoulder-width apart.   2. Slowly bend your knees so that you squat down like you are going to sit in a chair. Make sure your knees do not go in front of your toes. 3. Lower yourself about 6 inches. Your heels should remain on the floor at all times. 4. Rise slowly to a standing position. Follow-up care is a key part of your treatment and safety. Be sure to make and go to all appointments, and call your doctor if you are having problems. It's also a good idea to know your test results and keep a list of the medicines you take. Where can you learn more? Go to http://lamonte-maría.info/. Enter C183 in the search box to learn more about \"Meniscus Tear: Exercises. \"  Current as of: March 21, 2017  Content Version: 11.4  © 6863-3226 [employer]. Care instructions adapted under license by [employer] (which disclaims liability or warranty for this information). If you have questions about a medical condition or this instruction, always ask your healthcare professional. [address] any warranty or liability for your use of this information.

## 2018-02-07 NOTE — MR AVS SNAPSHOT
[address] [address] [place] [address] 
[phone] Patient: Sumanth De Leon MRN: [id number] :1970 Visit Information Date & Time Provider Department Dept. Phone Encounter #  
 2018  2:15 PM Eladio Cheatham, [address] [place] [address], Ne and Internal Medicine [phone] [id number] Upcoming Health Maintenance Date Due  
 PAP AKA CERVICAL CYTOLOGY 2017 Influenza Age 5 to Adult 2017 DTaP/Tdap/Td series (2 - Td) 2024 Allergies as of 2018  Review Complete On: 2018 By: Eladio Cheatham NP No Known Allergies Current Immunizations  Never Reviewed Name Date Hep A and Hep B Vaccine 2015 Pneumococcal Polysaccharide (PPSV-23) 2015 Tdap 2014 Not reviewed this visit You Were Diagnosed With   
  
 Codes Comments Acute pain of left knee    -  Primary ICD-10-CM: D70.837 ICD-9-CM: 719.46 Depression with anxiety     ICD-10-CM: F41.8 ICD-9-CM: 300.4 Vitals BP Pulse Temp Resp Height(growth percentile) Weight(growth percentile) 134/90 (BP 1 Location: Left arm, BP Patient Position: Sitting) 79 98 °F (36.7 °C) (Oral) 18 5' 4.02\" (1.626 m) 180 lb 9.6 oz (81.9 kg) LMP SpO2 BMI OB Status Smoking Status 01/15/2018 (Approximate) 96% 30.98 kg/m2 Having regular periods Current Every Day Smoker BMI and BSA Data Body Mass Index Body Surface Area 30.98 kg/m 2 1.92 m 2 Preferred Pharmacy Pharmacy Name Phone Miguelito Lemus [address], [facility] [phone] Your Updated Medication List  
  
   
This list is accurate as of: 18  2:59 PM.  Always use your most recent med list.  
  
  
  
  
 azaTHIOprine 50 mg tablet Commonly known as:  The Pepsi Take 50 mg by mouth daily. diclofenac 1 % Gel Commonly known as:  VOLTAREN Apply 2 g to affected area four (4) times daily. FLUoxetine 20 mg capsule Commonly known as:  PROzac Take 2 Caps by mouth daily. omeprazole 40 mg capsule Commonly known as:  PRILOSEC Take 40 mg by mouth daily. REMICADE 100 mg injection Generic drug:  inFLIXimab  
5 mg/kg by IntraVENous route every two (2) months. Prescriptions Sent to Pharmacy Refills FLUoxetine (PROZAC) 20 mg capsule 3 Sig: Take 2 Caps by mouth daily. Class: Normal  
 Pharmacy: [facility] Ph #: [phone] Route: Oral  
 diclofenac (VOLTAREN) 1 % gel 0 Sig: Apply 2 g to affected area four (4) times daily. Class: Normal  
 Pharmacy: [facility] Ph #: [phone] Route: Topical  
  
We Performed the Following REFERRAL TO ORTHOPEDIC SURGERY [REF62 Custom] Referral Information Referral ID Referred By Referred To  
  
 [id number] Marylee Roof [facility]   
    [address] [address] [place], [facility] [address] Visits Status Start Date End Date 1 New Request 2/7/18 2/7/19 If your referral has a status of pending review or denied, additional information will be sent to support the outcome of this decision. Patient Instructions Meniscus Tear: Exercises Your Care Instructions Here are some examples of typical rehabilitation exercises for your condition. Start each exercise slowly. Ease off the exercise if you start to have pain. Your doctor or physical therapist will tell you when you can start these exercises and which ones will work best for you. How to do the exercises Calf wall stretch 1. Stand facing a wall with your hands on the wall at about eye level. Put your affected leg about a step behind your other leg.  
2. Keeping your back leg straight and your back heel on the floor, bend your front knee and gently bring your hip and chest toward the wall until you feel a stretch in the calf of your back leg. 3. Hold the stretch for at least 15 to 30 seconds. 4. Repeat 2 to 4 times. 5. Repeat steps 1 through 4, but this time keep your back knee bent. Hamstring wall stretch 1. Lie on your back in a doorway, with your good leg through the open door. 2. Slide your affected leg up the wall to straighten your knee. You should feel a gentle stretch down the back of your leg. 1. Do not arch your back. 2. Do not bend either knee. 3. Keep one heel touching the floor and the other heel touching the wall. Do not point your toes. 3. Hold the stretch for at least 1 minute. Then over time, try to lengthen the time you hold the stretch to as long as 6 minutes. 4. Repeat 2 to 4 times. 5. If you do not have a place to do this exercise in a doorway, there is another way to do it: 6. Lie on your back, and bend your affected leg. 7. Loop a towel under the ball and toes of that foot, and hold the ends of the towel in your hands. 8. Straighten your knee, and slowly pull back on the towel. You should feel a gentle stretch down the back of your leg. 9. Hold the stretch for at least 15 to 30 seconds. Or even better, hold the stretch for 1 minute if you can. 10. Repeat 2 to 4 times. [employer] 1. Sit with your leg straight and supported on the floor or a firm bed. (If you feel discomfort in the front or back of your knee, place a small towel roll under your knee.) 2. Tighten the muscles on top of your thigh by pressing the back of your knee flat down to the floor. (If you feel discomfort under your kneecap, place a small towel roll under your knee.) 3. Hold for about 6 seconds, then rest up to 10 seconds. 4. Do 8 to 12 repetitions several times a day. Straight-leg raises to the front 1. Lie on your back with your good knee bent so that your foot rests flat on the floor. Your injured leg should be straight.  Make sure that your low back has a normal curve. You should be able to slip your flat hand in between the floor and the small of your back, with your palm touching the floor and your back touching the back of your hand. 2. Tighten the thigh muscles in the injured leg by pressing the back of your knee flat down to the floor. Hold your knee straight. 3. Keeping the thigh muscles tight, lift your injured leg up so that your heel is about 12 inches off the floor. Hold for 5 seconds and then lower slowly. 4. Do 8 to 12 repetitions. Straight-leg raises to the back 1. Lie on your stomach, and lift your leg straight up behind you (toward the ceiling). 2. Lift your toes about 6 inches off the floor, hold for about 6 seconds, then lower slowly. 3. Do 8 to 12 repetitions. Hamstring curls 1. Lie on your stomach with your knees straight. If your kneecap is uncomfortable, roll up a washcloth and put it under your leg just above your kneecap. 2. Lift the foot of your injured leg by bending the knee so that you bring the foot up toward your buttock. If this motion hurts, try it without bending your knee quite as far. This may help you avoid any painful motion. 3. Slowly lower your leg back to the floor. 4. Do 8 to 12 repetitions. 5. With permission from your doctor or physical therapist, you may also want to add a cuff weight to your ankle (not more than 5 pounds). With weight, you do not have to lift your leg more than 12 inches to get a hamstring workout. Wall slide with ball squeeze 1. Stand with your back against a wall and with your feet about shoulder-width apart. Your feet should be about 12 inches away from the wall. 2. Put a ball about the size of a soccer ball between your knees. Then slowly slide down the wall until your knees are bent about 20 to 30 degrees. 3. Tighten your thigh muscles by squeezing the ball between your knees. Hold that position for about 10 seconds, then stop squeezing.  Rest for up to 10 seconds between repetitions. 4. Repeat 8 to 12 times. Heel raises 1. Stand with your feet a few inches apart, with your hands lightly resting on a counter or chair in front of you. 2. Slowly raise your heels off the floor while keeping your knees straight. 3. Hold for about 6 seconds, then slowly lower your heels to the floor. 4. Do 8 to 12 repetitions several times during the day. Heel dig bridging Stop doing this exercise if it causes pain. 1. Lie on your back with both knees bent and your ankles bent so that only your heels are digging into the floor. Your knees should be bent about 90 degrees. 2. Then push your heels into the floor, squeeze your buttocks, and lift your hips off the floor until your shoulders, hips, and knees are all in a straight line. 3. Hold for about 6 seconds as you continue to breathe normally, and then slowly lower your hips back down to the floor and rest for up to 10 seconds. 4. Do 8 to 12 repetitions. Shallow standing knee bends Do this exercise only if you have very little pain; if you have no clicking, locking, or giving way in the injured knee; and if it does not hurt while you are doing 8 to 12 repetitions. 1. Stand with your hands lightly resting on a counter or chair in front of you. Put your feet shoulder-width apart. 2. Slowly bend your knees so that you squat down like you are going to sit in a chair. Make sure your knees do not go in front of your toes. 3. Lower yourself about 6 inches. Your heels should remain on the floor at all times. 4. Rise slowly to a standing position. Follow-up care is a key part of your treatment and safety. Be sure to make and go to all appointments, and call your doctor if you are having problems. It's also a good idea to know your test results and keep a list of the medicines you take. Where can you learn more? Go to http://lamonte-maría.info/. Enter C183 in the search box to learn more about \"Meniscus Tear: Exercises. \" Current as of: March 21, 2017 Content Version: 11.4 © 2028-5513 Healthwise, [employer] ([employer]). Care instructions adapted under license by [employer] (which disclaims liability or warranty for this information). If you have questions about a medical condition or this instruction, always ask your healthcare professional. [address] any warranty or liability for your use of this information. Introducing [facility]! Bubba Leija introduces Matter.io patient portal. Now you can access parts of your medical record, email your doctor's office, and request medication refills online. 1. In your internet browser, go to [email or web address] 2. Click on the First Time User? Click Here link in the Sign In box. You will see the New Member Sign Up page. 3. Enter your Matter.io Access Code exactly as it appears below. You will not need to use this code after youve completed the sign-up process. If you do not sign up before the expiration date, you must request a new code. · Matter.io Access Code: [email or web address] Expires: 5/8/2018  2:11 PM 
 
4. Enter the last four digits of your Social Security Number (xxxx) and Date of Birth (mm/dd/yyyy) as indicated and click Submit. You will be taken to the next sign-up page. 5. Create a Matter.io ID. This will be your Matter.io login ID and cannot be changed, so think of one that is secure and easy to remember. 6. Create a Matter.io password. You can change your password at any time. 7. Enter your Password Reset Question and Answer. This can be used at a later time if you forget your password. 8. Enter your e-mail address. You will receive e-mail notification when new information is available in [address] 9. Click Sign Up. You can now view and download portions of your medical record. 10. Click the Download Summary menu link to download a portable copy of your medical information. If you have questions, please visit the Frequently Asked Questions section of the "[employer] website. Remember, "[employer] is NOT to be used for urgent needs. For medical emergencies, dial 911. Now available from your iPhone and Android! Please provide this summary of care documentation to your next provider. Your primary care clinician is listed as [address] If you have any questions after today's visit, please call [phone].

## 2018-02-07 NOTE — PROGRESS NOTES
RM#7  Chief Complaint   Patient presents with    Knee Pain     left knee pain     1. Have you been to the ER, urgent care clinic since your last visit? Hospitalized since your last visit? No    2. Have you seen or consulted any other health care providers outside of the [address] [place] [address] since your last visit? Include any pap smears or colon screening.  No  Health Maintenance Due   Topic Date Due    PAP AKA CERVICAL CYTOLOGY  02/20/2017    Influenza Age 5 to Adult  08/01/2017

## 2018-06-18 ENCOUNTER — OFFICE VISIT (OUTPATIENT)
Dept: INTERNAL MEDICINE CLINIC | Age: 48
End: 2018-06-18

## 2018-06-18 VITALS
HEIGHT: 64 IN | WEIGHT: 176.6 LBS | RESPIRATION RATE: 16 BRPM | SYSTOLIC BLOOD PRESSURE: 143 MMHG | HEART RATE: 105 BPM | OXYGEN SATURATION: 95 % | BODY MASS INDEX: 30.15 KG/M2 | DIASTOLIC BLOOD PRESSURE: 55 MMHG | TEMPERATURE: 98.3 F

## 2018-06-18 DIAGNOSIS — R03.0 ELEVATED BLOOD PRESSURE READING: ICD-10-CM

## 2018-06-18 DIAGNOSIS — F41.9 ANXIETY DISORDER, UNSPECIFIED TYPE: ICD-10-CM

## 2018-06-18 DIAGNOSIS — K50.111 CROHN'S DISEASE OF LARGE INTESTINE WITH RECTAL BLEEDING (HCC): ICD-10-CM

## 2018-06-18 DIAGNOSIS — Z12.31 ENCOUNTER FOR SCREENING MAMMOGRAM FOR MALIGNANT NEOPLASM OF BREAST: ICD-10-CM

## 2018-06-18 DIAGNOSIS — Z01.419 WELL WOMAN EXAM WITH ROUTINE GYNECOLOGICAL EXAM: Primary | ICD-10-CM

## 2018-06-18 LAB
BILIRUB UR QL STRIP: NEGATIVE
GLUCOSE UR-MCNC: NEGATIVE MG/DL
KETONES P FAST UR STRIP-MCNC: NEGATIVE MG/DL
PH UR STRIP: 5.5 [PH] (ref 4.6–8)
PROT UR QL STRIP: NEGATIVE
SP GR UR STRIP: 1.03 (ref 1–1.03)
UA UROBILINOGEN AMB POC: NORMAL (ref 0.2–1)
URINALYSIS CLARITY POC: CLEAR
URINALYSIS COLOR POC: YELLOW
URINE BLOOD POC: NORMAL
URINE LEUKOCYTES POC: NEGATIVE
URINE NITRITES POC: NEGATIVE

## 2018-06-18 RX ORDER — VENLAFAXINE HYDROCHLORIDE 75 MG/1
75 CAPSULE, EXTENDED RELEASE ORAL DAILY
Qty: 30 CAP | Refills: 3 | Status: SHIPPED | OUTPATIENT
Start: 2018-06-18 | End: 2018-10-23 | Stop reason: SDUPTHER

## 2018-06-18 NOTE — PATIENT INSTRUCTIONS
Anxiety Disorder: Care Instructions  Your Care Instructions    Anxiety is a normal reaction to stress. Difficult situations can cause you to have symptoms such as sweaty palms and a nervous feeling. In an anxiety disorder, the symptoms are far more severe. Constant worry, muscle tension, trouble sleeping, nausea and diarrhea, and other symptoms can make normal daily activities difficult or impossible. These symptoms may occur for no reason, and they can affect your work, school, or social life. Medicines, counseling, and self-care can all help. Follow-up care is a key part of your treatment and safety. Be sure to make and go to all appointments, and call your doctor if you are having problems. It's also a good idea to know your test results and keep a list of the medicines you take. How can you care for yourself at home? · Take medicines exactly as directed. Call your doctor if you think you are having a problem with your medicine. · Go to your counseling sessions and follow-up appointments. · Recognize and accept your anxiety. Then, when you are in a situation that makes you anxious, say to yourself, \"This is not an emergency. I feel uncomfortable, but I am not in danger. I can keep going even if I feel anxious. \"  · Be kind to your body:  ¨ Relieve tension with exercise or a massage. ¨ Get enough rest.  ¨ Avoid alcohol, caffeine, nicotine, and illegal drugs. They can increase your anxiety level and cause sleep problems. ¨ Learn and do relaxation techniques. See below for more about these techniques. · Engage your mind. Get out and do something you enjoy. Go to a funny movie, or take a walk or hike. Plan your day. Having too much or too little to do can make you anxious. · Keep a record of your symptoms. Discuss your fears with a good friend or family member, or join a support group for people with similar problems. Talking to others sometimes relieves stress.   · Get involved in social groups, or volunteer to help others. Being alone sometimes makes things seem worse than they are. · Get at least 30 minutes of exercise on most days of the week to relieve stress. Walking is a good choice. You also may want to do other activities, such as running, swimming, cycling, or playing tennis or team sports. Relaxation techniques  Do relaxation exercises 10 to 20 minutes a day. You can play soothing, relaxing music while you do them, if you wish. · Tell others in your house that you are going to do your relaxation exercises. Ask them not to disturb you. · Find a comfortable place, away from all distractions and noise. · Lie down on your back, or sit with your back straight. · Focus on your breathing. Make it slow and steady. · Breathe in through your nose. Breathe out through either your nose or mouth. · Breathe deeply, filling up the area between your navel and your rib cage. Breathe so that your belly goes up and down. · Do not hold your breath. · Breathe like this for 5 to 10 minutes. Notice the feeling of calmness throughout your whole body. As you continue to breathe slowly and deeply, relax by doing the following for another 5 to 10 minutes:  · Tighten and relax each muscle group in your body. You can begin at your toes and work your way up to your head. · Imagine your muscle groups relaxing and becoming heavy. · Empty your mind of all thoughts. · Let yourself relax more and more deeply. · Become aware of the state of calmness that surrounds you. · When your relaxation time is over, you can bring yourself back to alertness by moving your fingers and toes and then your hands and feet and then stretching and moving your entire body. Sometimes people fall asleep during relaxation, but they usually wake up shortly afterward. · Always give yourself time to return to full alertness before you drive a car or do anything that might cause an accident if you are not fully alert.  Never play a relaxation tape while you drive a car. When should you call for help? Call 911 anytime you think you may need emergency care. For example, call if:  ? · You feel you cannot stop from hurting yourself or someone else. ? Keep the numbers for these national suicide hotlines: 3-658-015-TALK (9-271.850.6291) and 0-143-OSRRVGE (1-785.101.7464). If you or someone you know talks about suicide or feeling hopeless, get help right away. ? Watch closely for changes in your health, and be sure to contact your doctor if:  ? · You have anxiety or fear that affects your life. ? · You have symptoms of anxiety that are new or different from those you had before. Where can you learn more? Go to http://lamonte-maría.info/. Enter P754 in the search box to learn more about \"Anxiety Disorder: Care Instructions. \"  Current as of: May 12, 2017  Content Version: 11.4  © 9590-5535 [employer]. Care instructions adapted under license by [employer] (which disclaims liability or warranty for this information). If you have questions about a medical condition or this instruction, always ask your healthcare professional. [place] [address] any warranty or liability for your use of this information. Well Visit, Ages 25 to 48: Care Instructions  Your Care Instructions    Physical exams can help you stay healthy. Your doctor has checked your overall health and may have suggested ways to take good care of yourself. He or she also may have recommended tests. At home, you can help prevent illness with healthy eating, regular exercise, and other steps. Follow-up care is a key part of your treatment and safety. Be sure to make and go to all appointments, and call your doctor if you are having problems. It's also a good idea to know your test results and keep a list of the medicines you take. How can you care for yourself at home? · Reach and stay at a healthy weight.  This will lower your risk for many problems, such as obesity, diabetes, heart disease, and high blood pressure. · Get at least 30 minutes of physical activity on most days of the week. Walking is a good choice. You also may want to do other activities, such as running, swimming, cycling, or playing tennis or team sports. Discuss any changes in your exercise program with your doctor. · Do not smoke or allow others to smoke around you. If you need help quitting, talk to your doctor about stop-smoking programs and medicines. These can increase your chances of quitting for good. · Talk to your doctor about whether you have any risk factors for sexually transmitted infections (STIs). Having one sex partner (who does not have STIs and does not have sex with anyone else) is a good way to avoid these infections. · Use birth control if you do not want to have children at this time. Talk with your doctor about the choices available and what might be best for you. · Protect your skin from too much sun. When you're outdoors from 10 a.m. to 4 p.m., stay in the shade or cover up with clothing and a hat with a wide brim. Wear sunglasses that block UV rays. Even when it's cloudy, put broad-spectrum sunscreen (SPF 30 or higher) on any exposed skin. · See a dentist one or two times a year for checkups and to have your teeth cleaned. · Wear a seat belt in the car. · Drink alcohol in moderation, if at all. That means no more than 2 drinks a day for men and 1 drink a day for women. Follow your doctor's advice about when to have certain tests. These tests can spot problems early. For everyone  · Cholesterol. Have the fat (cholesterol) in your blood tested after age 21. Your doctor will tell you how often to have this done based on your age, family history, or other things that can increase your risk for heart disease. · Blood pressure. Have your blood pressure checked during a routine doctor visit.  Your doctor will tell you how often to check your blood pressure based on your age, your blood pressure results, and other factors. · Vision. Talk with your doctor about how often to have a glaucoma test.  · Diabetes. Ask your doctor whether you should have tests for diabetes. · Colon cancer. Have a test for colon cancer at age 48. You may have one of several tests. If you are younger than 48, you may need a test earlier if you have any risk factors. Risk factors include whether you already had a precancerous polyp removed from your colon or whether your parent, brother, sister, or child has had colon cancer. For women  · Breast exam and mammogram. Talk to your doctor about when you should have a clinical breast exam and a mammogram. Medical experts differ on whether and how often women under 50 should have these tests. Your doctor can help you decide what is right for you. · Pap test and pelvic exam. Begin Pap tests at age 24. A Pap test is the best way to find cervical cancer. The test often is part of a pelvic exam. Ask how often to have this test.  · Tests for sexually transmitted infections (STIs). Ask whether you should have tests for STIs. You may be at risk if you have sex with more than one person, especially if your partners do not wear condoms. For men  · Tests for sexually transmitted infections (STIs). Ask whether you should have tests for STIs. You may be at risk if you have sex with more than one person, especially if you do not wear a condom. · Testicular cancer exam. Ask your doctor whether you should check your testicles regularly. · Prostate exam. Talk to your doctor about whether you should have a blood test (called a PSA test) for prostate cancer. Experts differ on whether and when men should have this test. Some experts suggest it if you are older than 39 and are -American or have a father or brother who got prostate cancer when he was younger than 72. When should you call for help?   Watch closely for changes in your health, and be sure to contact your doctor if you have any problems or symptoms that concern you. Where can you learn more? Go to http://lamonte-maría.info/. Enter P072 in the search box to learn more about \"Well Visit, Ages 25 to 48: Care Instructions. \"  Current as of: May 12, 2017  Content Version: 11.4  © 9109-8390 [employer]. Care instructions adapted under license by [employer] (which disclaims liability or warranty for this information). If you have questions about a medical condition or this instruction, always ask your healthcare professional. [place] [address] any warranty or liability for your use of this information.

## 2018-06-18 NOTE — MR AVS SNAPSHOT
[address] [address] [place] [address] 
[phone] Patient: Hina Beth MRN: [id number] :1970 Visit Information Date & Time Provider Department Dept. Phone Encounter #  
 2018  3:15 PM Poppy Daya Quadra [id number] Pediatrics and Internal Medicine [phone] [id number] Follow-up Instructions Return in about 4 weeks (around 2018) for anxiety. Upcoming Health Maintenance Date Due  
 PAP AKA CERVICAL CYTOLOGY 2017 Influenza Age 5 to Adult 2018 DTaP/Tdap/Td series (2 - Td) 2024 Allergies as of 2018  Review Complete On: 2018 By: Chet Wade, Certified Medical Assistant No Known Allergies Current Immunizations  Never Reviewed Name Date Hep A and Hep B Vaccine 2015 Pneumococcal Polysaccharide (PPSV-23) 2015 Tdap 2014 Not reviewed this visit You Were Diagnosed With   
  
 Codes Comments Well woman exam with routine gynecological exam    -  Primary ICD-10-CM: D86.127 ICD-9-CM: V72.31 Anxiety disorder, unspecified type     ICD-10-CM: F41.9 ICD-9-CM: 300.00 Crohn's disease of large intestine with rectal bleeding ([facility]     ICD-10-CM: K50.111 ICD-9-CM: 555.1 Elevated blood pressure reading     ICD-10-CM: R03.0 ICD-9-CM: 796.2 Encounter for screening mammogram for malignant neoplasm of breast     ICD-10-CM: Z12.31 
ICD-9-CM: V76.12 Vitals BP Pulse Temp Resp Height(growth percentile) Weight(growth percentile) 143/55 (!) 105 98.3 °F (36.8 °C) (Oral) 16 5' 4.02\" (1.626 m) 176 lb 9.6 oz (80.1 kg) LMP SpO2 BMI OB Status Smoking Status 04/15/2018 (Exact Date) 95% 30.29 kg/m2 Unknown Current Every Day Smoker Vitals History BMI and BSA Data Body Mass Index Body Surface Area  
 30.29 kg/m 2 1.9 m 2 Preferred Pharmacy Pharmacy Name Phone [facility] [address], [address] [place] [address] [phone] Your Updated Medication List  
  
   
This list is accurate as of 6/18/18  3:55 PM.  Always use your most recent med list.  
  
  
  
  
 azaTHIOprine 50 mg tablet Commonly known as:  The Pepsi Take 50 mg by mouth daily. diclofenac 1 % Gel Commonly known as:  VOLTAREN Apply 2 g to affected area four (4) times daily. omeprazole 40 mg capsule Commonly known as:  PRILOSEC Take 40 mg by mouth daily. REMICADE 100 mg injection Generic drug:  inFLIXimab  
5 mg/kg by IntraVENous route every two (2) months. venlafaxine-SR 75 mg capsule Commonly known as:  EFFEXOR-XR Take 1 Cap by mouth daily. Prescriptions Sent to Pharmacy Refills  
 venlafaxine-SR (EFFEXOR-XR) 75 mg capsule 3 Sig: Take 1 Cap by mouth daily. Class: Normal  
 [facility] [address] Ph #: [phone] Route: Oral  
  
We Performed the Following AMB POC URINALYSIS DIP STICK AUTO W/O MICRO [18843 CPT(R)] CBC WITH AUTOMATED DIFF [24197 CPT(R)] METABOLIC PANEL, COMPREHENSIVE [19932 CPT(R)] PAP LB, HPV-H+LR(359940) [[id number] Custom] PAP, IG, RFX HPV ASCUS (472040) [96958 CPT(R)] THYROID ANTIBODY PANEL [90282 CPT(R)] TSH RFX ON ABNORMAL TO FREE T4 [[id number] Custom] VITAMIN D, 25 HYDROXY [id number] CPT(R)] Follow-up Instructions Return in about 4 weeks (around 7/16/2018) for anxiety. To-Do List   
 06/18/2018 Imaging:  DEXA BONE DENSITY STUDY AXIAL   
  
 06/18/2018 Imaging:  PETROS MAMMO BI SCREENING INCL CAD Patient Instructions Anxiety Disorder: Care Instructions Your Care Instructions Anxiety is a normal reaction to stress. Difficult situations can cause you to have symptoms such as sweaty palms and a nervous feeling. In an anxiety disorder, the symptoms are far more severe.  Constant worry, muscle tension, trouble sleeping, nausea and diarrhea, and other symptoms can make normal daily activities difficult or impossible. These symptoms may occur for no reason, and they can affect your work, school, or social life. Medicines, counseling, and self-care can all help. Follow-up care is a key part of your treatment and safety. Be sure to make and go to all appointments, and call your doctor if you are having problems. It's also a good idea to know your test results and keep a list of the medicines you take. How can you care for yourself at home? · Take medicines exactly as directed. Call your doctor if you think you are having a problem with your medicine. · Go to your counseling sessions and follow-up appointments. · Recognize and accept your anxiety. Then, when you are in a situation that makes you anxious, say to yourself, \"This is not an emergency. I feel uncomfortable, but I am not in danger. I can keep going even if I feel anxious. \" · Be kind to your body: ¨ Relieve tension with exercise or a massage. ¨ Get enough rest. 
¨ Avoid alcohol, caffeine, nicotine, and illegal drugs. They can increase your anxiety level and cause sleep problems. ¨ Learn and do relaxation techniques. See below for more about these techniques. · Engage your mind. Get out and do something you enjoy. Go to a funny movie, or take a walk or hike. Plan your day. Having too much or too little to do can make you anxious. · Keep a record of your symptoms. Discuss your fears with a good friend or family member, or join a support group for people with similar problems. Talking to others sometimes relieves stress. · Get involved in social groups, or volunteer to help others. Being alone sometimes makes things seem worse than they are. · Get at least 30 minutes of exercise on most days of the week to relieve stress. Walking is a good choice.  You also may want to do other activities, such as running, swimming, cycling, or playing tennis or team sports. Relaxation techniques Do relaxation exercises 10 to 20 minutes a day. You can play soothing, relaxing music while you do them, if you wish. · Tell others in your house that you are going to do your relaxation exercises. Ask them not to disturb you. · Find a comfortable place, away from all distractions and noise. · Lie down on your back, or sit with your back straight. · Focus on your breathing. Make it slow and steady. · Breathe in through your nose. Breathe out through either your nose or mouth. · Breathe deeply, filling up the area between your navel and your rib cage. Breathe so that your belly goes up and down. · Do not hold your breath. · Breathe like this for 5 to 10 minutes. Notice the feeling of calmness throughout your whole body. As you continue to breathe slowly and deeply, relax by doing the following for another 5 to 10 minutes: · Tighten and relax each muscle group in your body. You can begin at your toes and work your way up to your head. · Imagine your muscle groups relaxing and becoming heavy. · Empty your mind of all thoughts. · Let yourself relax more and more deeply. · Become aware of the state of calmness that surrounds you. · When your relaxation time is over, you can bring yourself back to alertness by moving your fingers and toes and then your hands and feet and then stretching and moving your entire body. Sometimes people fall asleep during relaxation, but they usually wake up shortly afterward. · Always give yourself time to return to full alertness before you drive a car or do anything that might cause an accident if you are not fully alert. Never play a relaxation tape while you drive a car. When should you call for help? Call 911 anytime you think you may need emergency care. For example, call if: 
? · You feel you cannot stop from hurting yourself or someone else. ?Keep the numbers for these national suicide hotlines: 5-988-878-TALK (5-089-698-679.702.2232) and 3-379-KCXVDVV (1-224.890.8189). If you or someone you know talks about suicide or feeling hopeless, get help right away. ? Watch closely for changes in your health, and be sure to contact your doctor if: 
? · You have anxiety or fear that affects your life. ? · You have symptoms of anxiety that are new or different from those you had before. Where can you learn more? Go to http://lamonte-maría.info/. Enter P754 in the search box to learn more about \"Anxiety Disorder: Care Instructions. \" Current as of: May 12, 2017 Content Version: 11.4 © 3129-9103 S.N. Safe[employer]Software. Care instructions adapted under license by [employer] (which disclaims liability or warranty for this information). If you have questions about a medical condition or this instruction, always ask your healthcare professional. [place] [address] any warranty or liability for your use of this information. Well Visit, Ages 25 to 48: Care Instructions Your Care Instructions Physical exams can help you stay healthy. Your doctor has checked your overall health and may have suggested ways to take good care of yourself. He or she also may have recommended tests. At home, you can help prevent illness with healthy eating, regular exercise, and other steps. Follow-up care is a key part of your treatment and safety. Be sure to make and go to all appointments, and call your doctor if you are having problems. It's also a good idea to know your test results and keep a list of the medicines you take. How can you care for yourself at home? · Reach and stay at a healthy weight. This will lower your risk for many problems, such as obesity, diabetes, heart disease, and high blood pressure. · Get at least 30 minutes of physical activity on most days of the week. Walking is a good choice. You also may want to do other activities, such as running, swimming, cycling, or playing tennis or team sports. Discuss any changes in your exercise program with your doctor. · Do not smoke or allow others to smoke around you. If you need help quitting, talk to your doctor about stop-smoking programs and medicines. These can increase your chances of quitting for good. · Talk to your doctor about whether you have any risk factors for sexually transmitted infections (STIs). Having one sex partner (who does not have STIs and does not have sex with anyone else) is a good way to avoid these infections. · Use birth control if you do not want to have children at this time. Talk with your doctor about the choices available and what might be best for you. · Protect your skin from too much sun. When you're outdoors from 10 a.m. to 4 p.m., stay in the shade or cover up with clothing and a hat with a wide brim. Wear sunglasses that block UV rays. Even when it's cloudy, put broad-spectrum sunscreen (SPF 30 or higher) on any exposed skin. · See a dentist one or two times a year for checkups and to have your teeth cleaned. · Wear a seat belt in the car. · Drink alcohol in moderation, if at all. That means no more than 2 drinks a day for men and 1 drink a day for women. Follow your doctor's advice about when to have certain tests. These tests can spot problems early. For everyone · Cholesterol. Have the fat (cholesterol) in your blood tested after age 21. Your doctor will tell you how often to have this done based on your age, family history, or other things that can increase your risk for heart disease. · Blood pressure. Have your blood pressure checked during a routine doctor visit. Your doctor will tell you how often to check your blood pressure based on your age, your blood pressure results, and other factors. · Vision.  Talk with your doctor about how often to have a glaucoma test. 
 · Diabetes. Ask your doctor whether you should have tests for diabetes. · Colon cancer. Have a test for colon cancer at age 48. You may have one of several tests. If you are younger than 48, you may need a test earlier if you have any risk factors. Risk factors include whether you already had a precancerous polyp removed from your colon or whether your parent, brother, sister, or child has had colon cancer. For women · Breast exam and mammogram. Talk to your doctor about when you should have a clinical breast exam and a mammogram. Medical experts differ on whether and how often women under 50 should have these tests. Your doctor can help you decide what is right for you. · Pap test and pelvic exam. Begin Pap tests at age 24. A Pap test is the best way to find cervical cancer. The test often is part of a pelvic exam. Ask how often to have this test. 
· Tests for sexually transmitted infections (STIs). Ask whether you should have tests for STIs. You may be at risk if you have sex with more than one person, especially if your partners do not wear condoms. For men · Tests for sexually transmitted infections (STIs). Ask whether you should have tests for STIs. You may be at risk if you have sex with more than one person, especially if you do not wear a condom. · Testicular cancer exam. Ask your doctor whether you should check your testicles regularly. · Prostate exam. Talk to your doctor about whether you should have a blood test (called a PSA test) for prostate cancer. Experts differ on whether and when men should have this test. Some experts suggest it if you are older than 39 and are -American or have a father or brother who got prostate cancer when he was younger than 72. When should you call for help? Watch closely for changes in your health, and be sure to contact your doctor if you have any problems or symptoms that concern you. Where can you learn more? Go to http://lamonte-maría.info/. Enter P072 in the search box to learn more about \"Well Visit, Ages 25 to 48: Care Instructions. \" Current as of: May 12, 2017 Content Version: 11.4 © 2703-7581 Healthwise, Incorporated. Care instructions adapted under license by [employer] (which disclaims liability or warranty for this information). If you have questions about a medical condition or this instruction, always ask your healthcare professional. [address] any warranty or liability for your use of this information. Introducing [facility]! [employer] introduces Space Monkey patient portal. Now you can access parts of your medical record, email your doctor's office, and request medication refills online. 1. In your internet browser, go to [email or web address] 2. Click on the First Time User? Click Here link in the Sign In box. You will see the New Member Sign Up page. 3. Enter your Space Monkey Access Code exactly as it appears below. You will not need to use this code after youve completed the sign-up process. If you do not sign up before the expiration date, you must request a new code. · Space Monkey Access Code: [email or web address] Expires: 9/16/2018  3:07 PM 
 
4. Enter the last four digits of your Social Security Number (xxxx) and Date of Birth (mm/dd/yyyy) as indicated and click Submit. You will be taken to the next sign-up page. 5. Create a Space Monkey ID. This will be your Space Monkey login ID and cannot be changed, so think of one that is secure and easy to remember. 6. Create a Space Monkey password. You can change your password at any time. 7. Enter your Password Reset Question and Answer. This can be used at a later time if you forget your password. 8. Enter your e-mail address. You will receive e-mail notification when new information is available in [address] 9. Click Sign Up.  You can now view and download portions of your medical record. 10. Click the Download Summary menu link to download a portable copy of your medical information. If you have questions, please visit the Frequently Asked Questions section of the Whatâ€™s On Foodie website. Remember, Whatâ€™s On Foodie is NOT to be used for urgent needs. For medical emergencies, dial 911. Now available from your iPhone and Android! Please provide this summary of care documentation to your next provider. Your primary care clinician is listed as [address] If you have any questions after today's visit, please call [phone].

## 2018-06-18 NOTE — PROGRESS NOTES
HISTORY OF PRESENT ILLNESS  Ashlee Woods is a 50 y.o. female presents for well woman exam  HPI   , children 34-3 years old   Irregular menses for a few years    Pap smear ~ 4 years ago. No abnormal pap    No recent sexual activity    Mammogram past due    Prozac 40 mg ineffective for anxiety, would  like higher dose    Still very anxious, flying off the handle    She is changing GI providers, will see new provider next month at  [address] [place] [address]. Notes she has been having colonoscopy 3 times a year    Concern about recurrent C-diff infection    Discontinued medications prescribed for UC, medications  were not working, same amount of diarrhea on and off medication usually 6- 7 times daily. 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    Past Medical History:   Diagnosis Date    Arthritis     neck, back, hips & knees    Chronic fatigue     Colitis     [facility]'s colitis ([facility]     De Quervain's tenosynovitis, bilateral     Depression     Fibromyalgia     Patellofemoral syndrome of both knees     Pedal edema     Radial styloid fracture        Past Surgical History:   Procedure Laterality Date    HX WISDOM TEETH EXTRACTION      TX COLSC FLX W/NDSC US XM RCTM ET AL LMTD&ADJ STRUX       Current Outpatient Prescriptions on File Prior to Visit   Medication Sig Dispense Refill    azaTHIOprine (IMURAN) 50 mg tablet Take 50 mg by mouth daily.  diclofenac (VOLTAREN) 1 % gel Apply 2 g to affected area four (4) times daily. 100 g 0    inFLIXimab (REMICADE) 100 mg injection 5 mg/kg by IntraVENous route every two (2) months.  omeprazole (PRILOSEC) 40 mg capsule Take 40 mg by mouth daily. No current facility-administered medications on file prior to visit.       Family History   Problem Relation Age of Onset    Hypertension Mother    [facility] Psoriasis Mother     Cancer Father     Crohn's Disease Father     Crohn's Disease Brother     Cancer Paternal Aunt     Cancer Paternal Uncle        Review of Systems   Constitutional: Negative for chills, fever and weight loss. HENT: Negative. Eyes: Negative. Cardiovascular: Negative for chest pain, palpitations and leg swelling. Gastrointestinal: Positive for abdominal pain and diarrhea. Genitourinary: Negative. Skin: Negative. Neurological: Positive for sensory change. Psychiatric/Behavioral: Negative for depression and substance abuse. The patient is nervous/anxious. The patient does not have insomnia. /55  Pulse (!) 105  Temp 98.3 °F (36.8 °C) (Oral)   Resp 16  Ht 5' 4.02\" (1.626 m)  Wt 176 lb 9.6 oz (80.1 kg)  LMP 04/15/2018 (Exact Date)  SpO2 95%  BMI 30.29 kg/m2  Physical Exam   Constitutional: She is oriented to person, place, and time. She appears well-developed and well-nourished. No distress. Eyes: Conjunctivae are normal. Right eye exhibits no discharge. Left eye exhibits no discharge. No scleral icterus. Neck: No thyromegaly present. Cardiovascular: Tachycardia present. Exam reveals no gallop. No murmur heard. Pulmonary/Chest: Effort normal and breath sounds normal. No respiratory distress. She has no wheezes. Right breast exhibits no inverted nipple, no mass, no nipple discharge, no skin change and no tenderness. Left breast exhibits no inverted nipple, no mass, no nipple discharge, no skin change and no tenderness. Breasts are symmetrical.   Abdominal: Soft. Bowel sounds are normal. She exhibits no distension and no mass. There is no tenderness. There is no rebound and no guarding. Hernia confirmed negative in the right inguinal area and confirmed negative in the left inguinal area. Genitourinary: No breast swelling or tenderness. There is no rash, tenderness, lesion or injury on the right labia. There is no rash, tenderness, lesion or injury on the left labia. Uterus is not deviated, not enlarged, not fixed and not tender.  Cervix exhibits no motion tenderness, no discharge and no friability. Right adnexum displays no mass, no tenderness and no fullness. Left adnexum displays no mass, no tenderness and no fullness. No erythema, tenderness or bleeding in the vagina. No foreign body in the vagina. No signs of injury around the vagina. No vaginal discharge found. Genitourinary Comments: Declined rectal exam   Musculoskeletal: She exhibits no edema, tenderness or deformity. Lymphadenopathy:     She has no cervical adenopathy. Right: No inguinal adenopathy present. Left: No inguinal adenopathy present. Neurological: She is alert and oriented to person, place, and time. No cranial nerve deficit. Skin: Skin is warm and dry. She is not diaphoretic. Psychiatric: She has a normal mood and affect. Her behavior is normal. Judgment and thought content normal.       ASSESSMENT and PLAN    ICD-10-CM ICD-9-CM    1. Well woman exam with routine gynecological exam Z01.419 V72.31 PAP, IG, RFX HPV ASCUS (148434)      PAP LB, BPC-L+UV(696093)      METABOLIC PANEL, COMPREHENSIVE      CBC WITH AUTOMATED DIFF      AMB POC URINALYSIS DIP STICK AUTO W/O MICRO   2. Anxiety disorder, unspecified type F41.9 300.00 venlafaxine-SR (EFFEXOR-XR) 75 mg capsule      THYROID ANTIBODY PANEL      TSH RFX ON ABNORMAL TO FREE T4   3. Crohn's disease of large intestine with rectal bleeding (HCC) K50.111 555.1 DEXA BONE DENSITY STUDY AXIAL      VITAMIN D, 25 HYDROXY   4. Elevated blood pressure reading R03.0 796.2    5. Encounter for screening mammogram for malignant neoplasm of breast Z12.31 V76.12 [facility] MAMMO BI SCREENING INCL CAD     Follow-up Disposition:  Return in about 4 weeks (around 7/16/2018) for anxiety. lab results and schedule of future lab studies reviewed with patient  reviewed diet, exercise and weight control  reviewed medications and side effects in detail    2. Uncontrolled. D/C Prozac, start above medications. Encouraged CBT    3.  See new provider as scheduled    4. Discussed lifestyle management,        Patient voices understanding and acceptance of this advice and will call back if any further questions or concerns. An After Visit Summary was printed and given to the patient.

## 2018-06-18 NOTE — PROGRESS NOTES
Aníbal Pringle is a 50 y.o. female     Chief Complaint   Patient presents with    Well Woman     1. Have you been to the ER, urgent care clinic since your last visit? Hospitalized since your last visit? No     2. Have you seen or consulted any other health care providers outside of the [address] [place] [address] since your last visit? Include any pap smears or colon screening.   No     Visit Vitals    /55    Pulse (!) 105    Temp 98.3 °F (36.8 °C) (Oral)    Resp 16    Ht 5' 4.02\" (1.626 m)    Wt 176 lb 9.6 oz (80.1 kg)    SpO2 95%    BMI 30.29 kg/m2

## 2018-06-19 LAB
25(OH)D3+25(OH)D2 SERPL-MCNC: 26.2 NG/ML (ref 30–100)
ALBUMIN SERPL-MCNC: 4.2 G/DL (ref 3.5–5.5)
ALBUMIN/GLOB SERPL: 1.5 {RATIO} (ref 1.2–2.2)
ALP SERPL-CCNC: 74 IU/L (ref 39–117)
ALT SERPL-CCNC: 11 IU/L (ref 0–32)
AST SERPL-CCNC: 13 IU/L (ref 0–40)
BASOPHILS # BLD AUTO: 0 X10E3/UL (ref 0–0.2)
BASOPHILS NFR BLD AUTO: 0 %
BILIRUB SERPL-MCNC: 0.2 MG/DL (ref 0–1.2)
BUN SERPL-MCNC: 15 MG/DL (ref 6–24)
BUN/CREAT SERPL: 16 (ref 9–23)
CALCIUM SERPL-MCNC: 9.3 MG/DL (ref 8.7–10.2)
CHLORIDE SERPL-SCNC: 102 MMOL/L (ref 96–106)
CO2 SERPL-SCNC: 23 MMOL/L (ref 20–29)
CREAT SERPL-MCNC: 0.96 MG/DL (ref 0.57–1)
EOSINOPHIL # BLD AUTO: 0.3 X10E3/UL (ref 0–0.4)
EOSINOPHIL NFR BLD AUTO: 3 %
ERYTHROCYTE [DISTWIDTH] IN BLOOD BY AUTOMATED COUNT: 13.8 % (ref 12.3–15.4)
GLOBULIN SER CALC-MCNC: 2.8 G/DL (ref 1.5–4.5)
GLUCOSE SERPL-MCNC: 88 MG/DL (ref 65–99)
HCT VFR BLD AUTO: 39.8 % (ref 34–46.6)
HGB BLD-MCNC: 12.9 G/DL (ref 11.1–15.9)
IMM GRANULOCYTES # BLD: 0 X10E3/UL (ref 0–0.1)
IMM GRANULOCYTES NFR BLD: 0 %
LYMPHOCYTES # BLD AUTO: 2.7 X10E3/UL (ref 0.7–3.1)
LYMPHOCYTES NFR BLD AUTO: 28 %
MCH RBC QN AUTO: 29.3 PG (ref 26.6–33)
MCHC RBC AUTO-ENTMCNC: 32.4 G/DL (ref 31.5–35.7)
MCV RBC AUTO: 90 FL (ref 79–97)
MONOCYTES # BLD AUTO: 0.4 X10E3/UL (ref 0.1–0.9)
MONOCYTES NFR BLD AUTO: 5 %
NEUTROPHILS # BLD AUTO: 6 X10E3/UL (ref 1.4–7)
NEUTROPHILS NFR BLD AUTO: 64 %
PLATELET # BLD AUTO: 297 X10E3/UL (ref 150–379)
POTASSIUM SERPL-SCNC: 4.3 MMOL/L (ref 3.5–5.2)
PROT SERPL-MCNC: 7 G/DL (ref 6–8.5)
RBC # BLD AUTO: 4.41 X10E6/UL (ref 3.77–5.28)
SODIUM SERPL-SCNC: 140 MMOL/L (ref 134–144)
THYROGLOB AB SERPL-ACNC: <1 IU/ML (ref 0–0.9)
THYROPEROXIDASE AB SERPL-ACNC: 39 IU/ML (ref 0–34)
TSH SERPL DL<=0.005 MIU/L-ACNC: 1.71 UIU/ML (ref 0.45–4.5)
WBC # BLD AUTO: 9.4 X10E3/UL (ref 3.4–10.8)

## 2018-06-23 LAB
CYTOLOGIST CVX/VAG CYTO: NORMAL
DX ICD CODE: NORMAL
HPV I/H RISK 1 DNA CVX QL PROBE+SIG AMP: NEGATIVE
HPV LOW RISK DNA CVX QL PROBE+SIG AMP: NEGATIVE
Lab: NORMAL
OTHER STN SPEC: NORMAL
PATH REPORT.FINAL DX SPEC: NORMAL
STAT OF ADQ CVX/VAG CYTO-IMP: NORMAL

## 2018-07-09 PROBLEM — R76.8 ANTI-TPO ANTIBODIES PRESENT: Status: ACTIVE | Noted: 2018-07-09

## 2018-07-20 ENCOUNTER — TELEPHONE (OUTPATIENT)
Dept: INTERNAL MEDICINE CLINIC | Age: 48
End: 2018-07-20

## 2018-07-20 DIAGNOSIS — Z30.09 FAMILY PLANNING ADVICE: Primary | ICD-10-CM

## 2018-07-20 NOTE — TELEPHONE ENCOUNTER
Pt spoke with ROME Espinal at her June visit and discussed if she was sexually active at the time pt was not. Pt is now in a relationship and is sexually active and would like if ROME Espinal could send a new prescription for Birth control as soon as possible. Pt state's that she is currently having her Menstrual Cycle and really wanted to be able to start it right after pt use's the [facility] there # [phone].

## 2018-07-24 NOTE — TELEPHONE ENCOUNTER
Spoke with patient after verifying name and  regarding Mary Worrell's recommendations. Writer informed patient of Mary Worrell's recommendations. Patient given an opportunity to ask questions, repeated information, and verbalized understanding. (2) assistive person

## 2018-10-01 ENCOUNTER — TELEPHONE (OUTPATIENT)
Dept: INTERNAL MEDICINE CLINIC | Age: 48
End: 2018-10-01

## 2018-10-01 NOTE — TELEPHONE ENCOUNTER
Can sign orders, but need f/u appt for transition of care    Please obtain records from [facility] EMERGENCY [facility].

## 2018-10-01 NOTE — TELEPHONE ENCOUNTER
Detailed message left in regards to leaving verbal orders for PT, OT and Speech Therapy and faxing over orders to this office to be signed. Will discuss with Dr. Pamalee Opitz.

## 2018-10-01 NOTE — TELEPHONE ENCOUNTER
Rohit George from [facility] is calling in regards to pt. Pt was admitted to [facility] on 9/07 for stroke. Pt came out with PT, OT, ST orders and she would like to know if Dr. Abundio Tristan is willing to follow and sign orders.  Please call and advise at [phone]

## 2018-10-10 NOTE — TELEPHONE ENCOUNTER
Pt called to schedule [facility], pt states that she will call back this afternoon to schedule appt her ramps are being put in today and she has to make sure they are put on to make sure she can get out.

## 2018-10-23 ENCOUNTER — OFFICE VISIT (OUTPATIENT)
Dept: INTERNAL MEDICINE CLINIC | Age: 48
End: 2018-10-23

## 2018-10-23 VITALS
RESPIRATION RATE: 16 BRPM | BODY MASS INDEX: 29.74 KG/M2 | WEIGHT: 174.2 LBS | HEIGHT: 64 IN | OXYGEN SATURATION: 100 % | TEMPERATURE: 97.5 F | SYSTOLIC BLOOD PRESSURE: 128 MMHG | HEART RATE: 73 BPM | DIASTOLIC BLOOD PRESSURE: 80 MMHG

## 2018-10-23 DIAGNOSIS — F41.9 ANXIETY DISORDER, UNSPECIFIED TYPE: ICD-10-CM

## 2018-10-23 DIAGNOSIS — R39.15 URINARY URGENCY: ICD-10-CM

## 2018-10-23 DIAGNOSIS — K52.9 INFLAMMATORY BOWEL DISEASES (IBD): ICD-10-CM

## 2018-10-23 DIAGNOSIS — F41.8 DEPRESSION WITH ANXIETY: ICD-10-CM

## 2018-10-23 DIAGNOSIS — R76.8 ANTI-TPO ANTIBODIES PRESENT: ICD-10-CM

## 2018-10-23 DIAGNOSIS — M85.80 OSTEOPENIA, UNSPECIFIED LOCATION: ICD-10-CM

## 2018-10-23 DIAGNOSIS — G81.94 LEFT HEMIPARESIS (HCC): ICD-10-CM

## 2018-10-23 DIAGNOSIS — E78.5 HYPERLIPIDEMIA, UNSPECIFIED HYPERLIPIDEMIA TYPE: ICD-10-CM

## 2018-10-23 DIAGNOSIS — Z12.31 ENCOUNTER FOR SCREENING MAMMOGRAM FOR BREAST CANCER: ICD-10-CM

## 2018-10-23 DIAGNOSIS — I63.10 CEREBROVASCULAR ACCIDENT (CVA) DUE TO EMBOLISM OF PRECEREBRAL ARTERY (HCC): Primary | ICD-10-CM

## 2018-10-23 DIAGNOSIS — E55.9 VITAMIN D DEFICIENCY: ICD-10-CM

## 2018-10-23 DIAGNOSIS — E04.1 THYROID NODULE: ICD-10-CM

## 2018-10-23 DIAGNOSIS — K51.919 ULCERATIVE COLITIS WITH COMPLICATION, UNSPECIFIED LOCATION (HCC): ICD-10-CM

## 2018-10-23 RX ORDER — ASPIRIN 325 MG
325 TABLET, DELAYED RELEASE (ENTERIC COATED) ORAL DAILY
Qty: 30 TAB | Refills: 5 | Status: SHIPPED | OUTPATIENT
Start: 2018-10-23 | End: 2019-04-15 | Stop reason: SDUPTHER

## 2018-10-23 RX ORDER — ATORVASTATIN CALCIUM 20 MG/1
TABLET, FILM COATED ORAL DAILY
COMMUNITY
End: 2018-10-23 | Stop reason: SDUPTHER

## 2018-10-23 RX ORDER — ASPIRIN 325 MG
TABLET, DELAYED RELEASE (ENTERIC COATED) ORAL
COMMUNITY
End: 2018-10-23 | Stop reason: SDUPTHER

## 2018-10-23 RX ORDER — OMEPRAZOLE 40 MG/1
40 CAPSULE, DELAYED RELEASE ORAL DAILY
Qty: 30 CAP | Refills: 5 | Status: SHIPPED | OUTPATIENT
Start: 2018-10-23 | End: 2019-10-21

## 2018-10-23 RX ORDER — IBUPROFEN 200 MG
TABLET ORAL
Refills: 0 | COMMUNITY
Start: 2018-10-16 | End: 2019-10-21

## 2018-10-23 RX ORDER — AMITRIPTYLINE HYDROCHLORIDE 25 MG/1
25 TABLET, FILM COATED ORAL
Qty: 30 TAB | Refills: 5 | Status: SHIPPED | OUTPATIENT
Start: 2018-10-23 | End: 2019-04-15 | Stop reason: SDUPTHER

## 2018-10-23 RX ORDER — VENLAFAXINE HYDROCHLORIDE 75 MG/1
75 CAPSULE, EXTENDED RELEASE ORAL DAILY
Qty: 30 CAP | Refills: 5 | Status: SHIPPED | OUTPATIENT
Start: 2018-10-23 | End: 2019-04-19 | Stop reason: SDUPTHER

## 2018-10-23 RX ORDER — ATORVASTATIN CALCIUM 20 MG/1
20 TABLET, FILM COATED ORAL DAILY
Qty: 30 TAB | Refills: 5 | Status: SHIPPED | OUTPATIENT
Start: 2018-10-23 | End: 2019-04-15 | Stop reason: SDUPTHER

## 2018-10-23 NOTE — PROGRESS NOTES
Rm 13    Chief Complaint   Patient presents with   [facility] Follow Up     9/4/18, stroke     1. Have you been to the ER, urgent care clinic since your last visit? Hospitalized since your last visit? [facility], 9/4/18, stroke    2. Have you seen or consulted any other health care providers outside of the [facility] since your last visit? Include any pap smears or colon screening.  No    Health Maintenance Due   Topic Date Due    Influenza Age 5 to Adult  08/01/2018   pt declined flu vaccine    Learning Assessment 10/23/2018   PRIMARY LEARNER Patient   HIGHEST LEVEL OF EDUCATION - PRIMARY LEARNER  GRADUATED HIGH SCHOOL OR GED   BARRIERS PRIMARY LEARNER NONE   CO-LEARNER CAREGIVER No   PRIMARY LANGUAGE ENGLISH   LEARNER PREFERENCE PRIMARY DEMONSTRATION   ANSWERED BY patient   RELATIONSHIP SELF

## 2018-10-23 NOTE — PROGRESS NOTES
HPI:  Presents for f/u CVA    Admitted with CVA, left hemiparesis 9/4/18    Dc/d to rehab 9/7/18  Home 10/1/18    Pt reports some improved sensation on the left arm and leg  Still making some progress     Needs order for outpatient PT/OT since [facility] services will be ending    Living with mother  Sister comes by daily to assist    Pt taking and tolerating lipitor    Pt has quit smoking    Taking ASA EC - pt contact GI office and no change rec'd re: ASA vs Plavix    Pt reports difficulty falling to sleep   No meds yet. Past medical, Social, and Family history reviewed    Prior to Admission medications    Medication Sig Start Date End Date Taking? Authorizing Provider   aspirin delayed-release (ECOTRIN) 325 mg tablet Take  by mouth every six (6) hours as needed for Pain. Yes Provider, Historical   atorvastatin (LIPITOR) 20 mg tablet Take  by mouth daily. Yes Provider, Historical   nicotine (NICODERM CQ) 14 mg/24 hr patch APPLY 1 PATCH TO SKIN EVERY MORNING 10/16/18  Yes Provider, Historical   PREDNISONE PO Take 40 mg by mouth. Yes Provider, Historical   venlafaxine-SR (EFFEXOR-XR) 75 mg capsule Take 1 Cap by mouth daily. 6/18/18  Yes Collette Ply, NP   omeprazole (PRILOSEC) 40 mg capsule Take 40 mg by mouth daily. Yes Provider, Historical   azaTHIOprine (IMURAN) 50 mg tablet Take 50 mg by mouth daily. Provider, Historical   diclofenac (VOLTAREN) 1 % gel Apply 2 g to affected area four (4) times daily. 2/7/18   Collette Ply, NP   inFLIXimab (REMICADE) 100 mg injection 5 mg/kg by IntraVENous route every two (2) months. Provider, Historical          ROS  Complete ROS reviewed and negative or stable except as noted in HPI. Physical Exam   Constitutional: She is oriented to person, place, and time. She appears well-nourished. No distress. HENT:   Head: Normocephalic and atraumatic. Mouth/Throat: Oropharynx is clear and moist.   Eyes: EOM are normal. Pupils are equal, round, and reactive to light. No scleral icterus. Neck: Normal range of motion. Neck supple. No JVD present. Cardiovascular: Normal rate, regular rhythm and normal heart sounds. Exam reveals no gallop and no friction rub. No murmur heard. Pulmonary/Chest: Effort normal and breath sounds normal. No respiratory distress. She has no wheezes. She has no rales. Abdominal: Soft. She exhibits no distension. There is no tenderness. Musculoskeletal: Normal range of motion. She exhibits no edema. Lymphadenopathy:     She has no cervical adenopathy. Neurological: She is alert and oriented to person, place, and time. She exhibits abnormal muscle tone (left hemiparesis). Skin: Skin is warm. No rash noted. Psychiatric: She has a normal mood and affect. Nursing note and vitals reviewed. Prior labs reviewed. Reviewed prior imaging reports  Reviewed dc summary from [facility]      Assessment/Plan:    ICD-10-CM ICD-9-CM    1. Cerebrovascular accident (CVA) due to embolism of precerebral artery (HCC) I63.10 434.11 REFERRAL TO PHYSICAL THERAPY      REFERRAL TO OCCUPATIONAL THERAPY      HEMOGLOBIN A1C WITH EAG      REFERRAL TO NEUROLOGY   2. Ulcerative colitis with complication, unspecified location (HCC) K51.919 556.9 CBC WITH AUTOMATED DIFF      SED RATE (ESR)   3. Anti-TPO antibodies present R76.8 795.79    4. Depression with anxiety F41.8 300.4    5. Inflammatory bowel diseases (IBD) K52.9 558.9 SED RATE (ESR)   6. Thyroid nodule E04.1 241.0 T4, FREE      TSH 3RD GENERATION   7. Left hemiparesis (HCC) G81.94 342.90 REFERRAL TO PHYSICAL THERAPY      REFERRAL TO OCCUPATIONAL THERAPY      REFERRAL TO NEUROLOGY   8. Vitamin D deficiency E55.9 268.9 VITAMIN D, 25 HYDROXY   9.  Hyperlipidemia, unspecified hyperlipidemia type E78.5 272.4 CBC WITH AUTOMATED DIFF      CK      LIPID PANEL      METABOLIC PANEL, COMPREHENSIVE   10. Urinary urgency R39.15 788.63 CULTURE, URINE      URINALYSIS W/ RFLX MICROSCOPIC      AMB POC URINALYSIS DIP STICK AUTO W/O MICRO      amitriptyline (ELAVIL) 25 mg tablet   11. Encounter for screening mammogram for breast cancer Z12.31 V76.12 PETROS MAMMO BI SCREENING INCL CAD   12. Osteopenia, unspecified location M85.80 733.90 DEXA BONE DENSITY STUDY AXIAL   13. Anxiety disorder, unspecified type F41.9 300.00 venlafaxine-SR (EFFEXOR-XR) 75 mg capsule     Follow-up Disposition:  Return in about 3 months (around 1/23/2019), or if symptoms worsen or fail to improve, for stroke, sleep, bladder, cholesterol.    results and schedule of future studies reviewed with patient  reviewed diet  and weight   cardiovascular risk and specific lipid/LDL goals reviewed  reviewed medications and side effects in detail   Encouraged to review ASA vs Plavix with GI  Return for labs  Ref to neuro  Trial of melatonin  Trial of elavil for sleep and bladder  Outpatient PT/OT  DEXA due to prolonged pred use  Mammogram screening

## 2018-10-24 DIAGNOSIS — N30.01 ACUTE CYSTITIS WITH HEMATURIA: Primary | ICD-10-CM

## 2018-10-24 LAB
APPEARANCE UR: ABNORMAL
BACTERIA #/AREA URNS HPF: ABNORMAL /[HPF]
BILIRUB UR QL STRIP: NEGATIVE
CASTS URNS QL MICRO: ABNORMAL /LPF
COLOR UR: YELLOW
CRYSTALS URNS MICRO: ABNORMAL
EPI CELLS #/AREA URNS HPF: >10 /HPF
GLUCOSE UR QL: NEGATIVE
HGB UR QL STRIP: ABNORMAL
KETONES UR QL STRIP: NEGATIVE
LEUKOCYTE ESTERASE UR QL STRIP: ABNORMAL
MICRO URNS: ABNORMAL
MUCOUS THREADS URNS QL MICRO: PRESENT
NITRITE UR QL STRIP: NEGATIVE
PH UR STRIP: 5 [PH] (ref 5–7.5)
PROT UR QL STRIP: NEGATIVE
RBC #/AREA URNS HPF: ABNORMAL /HPF
SP GR UR: 1.02 (ref 1–1.03)
UNIDENT CRYS URNS QL MICRO: PRESENT
UROBILINOGEN UR STRIP-MCNC: 0.2 MG/DL (ref 0.2–1)
WBC #/AREA URNS HPF: ABNORMAL /HPF

## 2018-10-24 RX ORDER — CIPROFLOXACIN 250 MG/1
250 TABLET, FILM COATED ORAL 2 TIMES DAILY
Qty: 20 TAB | Refills: 0 | Status: SHIPPED | OUTPATIENT
Start: 2018-10-24 | End: 2018-11-03

## 2018-10-25 LAB — BACTERIA UR CULT: NORMAL

## 2018-10-25 NOTE — PROGRESS NOTES
Please notify pt of results    Urine culture is inconclusive. If her symptoms are better with the antibiotic, then she should complete the Rx. If no difference in 2 days, then she can stop the antibiotic.

## 2018-10-25 NOTE — PROGRESS NOTES
Please notify pt of results    Urine appears infected. This may account for some of her urinary symptoms. Still awaiting culture results, but start antibiotic empirically. cipro bid x 10 days ordered.

## 2018-11-06 ENCOUNTER — HOSPITAL ENCOUNTER (OUTPATIENT)
Dept: MAMMOGRAPHY | Age: 48
Discharge: HOME | End: 2018-11-06
Attending: INTERNAL MEDICINE
Payer: MEDICAID

## 2018-11-06 DIAGNOSIS — M85.80 OSTEOPENIA, UNSPECIFIED LOCATION: ICD-10-CM

## 2018-11-06 DIAGNOSIS — Z12.31 ENCOUNTER FOR SCREENING MAMMOGRAM FOR BREAST CANCER: ICD-10-CM

## 2018-11-06 PROCEDURE — 77067 SCR MAMMO BI INCL CAD: CPT

## 2018-11-06 PROCEDURE — 77080 DXA BONE DENSITY AXIAL: CPT

## 2018-11-07 ENCOUNTER — TELEPHONE (OUTPATIENT)
Dept: INTERNAL MEDICINE CLINIC | Age: 48
End: 2018-11-07

## 2018-11-14 ENCOUNTER — TELEPHONE (OUTPATIENT)
Dept: INTERNAL MEDICINE CLINIC | Age: 48
End: 2018-11-14

## 2018-11-14 NOTE — LETTER
2018 RE: Ms. Robson Saenz  [facility] [id number]  - 1970 To whom it may concern: 
 
I am writing as Ms. Angi Fuchs primary care physician. She suffered a stroke 16 resulting in a left hemiparesis. Despite continued therapy, she remains essentially wheelchair bound unless assisted. She is unable to drive at this time and thus unable to provide transportation for her children to and from school. Please consider her limitations in decisions regarding the transportation services provided for her children to attend school. Thank you for your consideration. Sincerely, Mc Saenz MD

## 2018-12-03 NOTE — TELEPHONE ENCOUNTER
----- Message from [employer] sent at 12/3/2018 11:06 AM EST -----  Regarding: Dr. Alf Palmer  Pt is requesting a call back from Dr. Raul Ding or nurse in reference to transportation for children. Needs a letter from practice stating limitations/ reasons on why she is not able to drive children to & from school due to medical reasons (wheelchair bound). Transportation will not process request until pt has not from  Would like to  from practice in the next few days. Pt best contact [phone].

## 2018-12-05 NOTE — TELEPHONE ENCOUNTER
Patient called on number on file. Message left to return call to office. Please inform patient that the letter is ready for pickup at the .

## 2018-12-13 ENCOUNTER — OFFICE VISIT (OUTPATIENT)
Dept: INTERNAL MEDICINE CLINIC | Age: 48
End: 2018-12-13

## 2018-12-13 VITALS
BODY MASS INDEX: 31.82 KG/M2 | WEIGHT: 186.4 LBS | TEMPERATURE: 97.8 F | RESPIRATION RATE: 16 BRPM | HEIGHT: 64 IN | OXYGEN SATURATION: 95 % | SYSTOLIC BLOOD PRESSURE: 145 MMHG | DIASTOLIC BLOOD PRESSURE: 88 MMHG | HEART RATE: 109 BPM

## 2018-12-13 DIAGNOSIS — M24.549 CONTRACTURE OF HAND: Primary | ICD-10-CM

## 2018-12-13 DIAGNOSIS — G81.94 LEFT HEMIPARESIS (HCC): ICD-10-CM

## 2018-12-13 DIAGNOSIS — M79.89 LEG SWELLING: ICD-10-CM

## 2018-12-13 DIAGNOSIS — I63.10 CEREBROVASCULAR ACCIDENT (CVA) DUE TO EMBOLISM OF PRECEREBRAL ARTERY (HCC): ICD-10-CM

## 2018-12-13 DIAGNOSIS — R19.7 DIARRHEA, UNSPECIFIED TYPE: ICD-10-CM

## 2018-12-13 DIAGNOSIS — K51.919 ULCERATIVE COLITIS WITH COMPLICATION, UNSPECIFIED LOCATION (HCC): ICD-10-CM

## 2018-12-13 NOTE — PROGRESS NOTES
Rm 15    Chief Complaint   Patient presents with    Hand Swelling     noticed on friday 12/7    Leg Swelling     noticed on friday 12/7     1. Have you been to the ER, urgent care clinic since your last visit? Hospitalized since your last visit? [facility], end of november, headache    2. Have you seen or consulted any other health care providers outside of the [address] [place] [address] since your last visit? Include any pap smears or colon screening.  No    Health Maintenance Due   Topic Date Due    Shingrix Vaccine Age 49> (1 of 2) 11/08/2018    FOBT Q 1 YEAR AGE 50-75  11/08/2018     Learning Assessment 10/23/2018   PRIMARY LEARNER Patient   HIGHEST LEVEL OF EDUCATION - PRIMARY LEARNER  GRADUATED HIGH SCHOOL OR GED   BARRIERS PRIMARY LEARNER NONE   CO-LEARNER CAREGIVER No   PRIMARY LANGUAGE ENGLISH   LEARNER PREFERENCE PRIMARY DEMONSTRATION   ANSWERED BY patient   RELATIONSHIP SELF

## 2018-12-13 NOTE — PROGRESS NOTES
HPI:  Presents for f/u diarrhea and swelling      Left hand pain and tightness  Naty in the AM    Left leg and hand swelling    Diarrhea  See telephone encounter    On pred for colitis    Past medical, Social, and Family history reviewed    Prior to Admission medications    Medication Sig Start Date End Date Taking? Authorizing Provider   vancomycin (VANCOCIN) 125 mg capsule Take 1 Cap by mouth four (4) times daily for 10 days. 12/12/18 12/22/18 Yes Ewelina Zuniga MD   nicotine (NICODERM CQ) 14 mg/24 hr patch APPLY 1 PATCH TO SKIN EVERY MORNING 10/16/18  Yes Provider, Historical   PREDNISONE PO Take 40 mg by mouth. Yes Provider, Historical   amitriptyline (ELAVIL) 25 mg tablet Take 1 Tab by mouth nightly. 10/23/18  Yes Ewelina Zuniga MD   atorvastatin (LIPITOR) 20 mg tablet Take 1 Tab by mouth daily. 10/23/18  Yes Ewelina Zuniga MD   venlafaxine-SR [facility] 75 mg capsule Take 1 Cap by mouth daily. 10/23/18  Yes Ewelina Zuniga MD   omeprazole (PRILOSEC) 40 mg capsule Take 1 Cap by mouth daily. 10/23/18  Yes Ewelina Zuniga MD   aspirin delayed-release (ECOTRIN) 325 mg tablet Take 1 Tab by mouth daily. 10/23/18  Yes Ewelina Zuniga MD   diclofenac (VOLTAREN) 1 % gel Apply 2 g to affected area four (4) times daily. 2/7/18  Yes Blanca Peña NP          ROS  Complete ROS reviewed and negative or stable except as noted in HPI. Physical Exam   Constitutional: She is oriented to person, place, and time. She appears well-nourished. No distress. HENT:   Head: Normocephalic and atraumatic. Mouth/Throat: Oropharynx is clear and moist.   Eyes: EOM are normal. Pupils are equal, round, and reactive to light. No scleral icterus. Neck: Normal range of motion. Neck supple. No JVD present. Cardiovascular: Normal rate, regular rhythm and normal heart sounds. Exam reveals no gallop and no friction rub. No murmur heard.   Pulmonary/Chest: Effort normal and breath sounds normal. No respiratory distress. She has no wheezes. She has no rales. Abdominal: Soft. She exhibits no distension. There is no tenderness. Musculoskeletal: Normal range of motion. She exhibits edema (trace - left hand, left ankle). Lymphadenopathy:     She has no cervical adenopathy. Neurological: She is alert and oriented to person, place, and time. She exhibits abnormal muscle tone (left hemiparesis). Skin: Skin is warm. No rash noted. Psychiatric: She has a normal mood and affect. Nursing note and vitals reviewed. Prior labs reviewed. Assessment/Plan:    ICD-10-CM ICD-9-CM    1. Contracture of hand M24.549 718.44 REFERRAL TO PHYSICIAL MEDICINE REHAB   2. Ulcerative colitis with complication, unspecified location ([facility] K51.919 556.9    3. Diarrhea, unspecified type R19.7 787.91    4. Left hemiparesis ([facility] G81.94 342.90 REFERRAL TO PHYSICIAL MEDICINE REHAB   5.  Cerebrovascular accident (CVA) due to embolism of precerebral artery (HCC) I63.10 434.11 REFERRAL TO PHYSICIAL MEDICINE REHAB   6. Leg swelling M79.89 729.81      Follow-up Disposition:  Return in about 2 months (around 2/13/2019), or if symptoms worsen or fail to improve, for blood pressure, specialists f/u.   results and schedule of future studies reviewed with patient  reviewed diet, exercise and weight     reviewed medications and side effects in detail  Compression stocking  Ref to PM&R - to review contracture care   May benefit from botox or muscle relaxants - defer to PM&R  PO vanc for possible C Diff  Monitor BP  If remains elevated then add HTN med - held today  But, if taper off pred that may help

## 2019-02-15 ENCOUNTER — DOCUMENTATION ONLY (OUTPATIENT)
Dept: INTERNAL MEDICINE CLINIC | Age: 49
End: 2019-02-15

## 2019-03-04 ENCOUNTER — TELEPHONE (OUTPATIENT)
Dept: INTERNAL MEDICINE CLINIC | Age: 49
End: 2019-03-04

## 2019-03-04 NOTE — TELEPHONE ENCOUNTER
Pt called stating that her temporary DMV form for handicap expires on 3/31/2019. Please complete a permanent DMV handicap form for pt, pt states no form was given to her. Please call pt and advise when ready for .  Best contact [phone]

## 2019-03-05 NOTE — TELEPHONE ENCOUNTER
Left detailed message on secured VM in regards to [facility] form ready for pickup at .

## 2019-04-15 DIAGNOSIS — R39.15 URINARY URGENCY: ICD-10-CM

## 2019-04-15 RX ORDER — ATORVASTATIN CALCIUM 20 MG/1
TABLET, FILM COATED ORAL
Qty: 30 TAB | Refills: 5 | Status: SHIPPED | OUTPATIENT
Start: 2019-04-15 | End: 2019-06-19 | Stop reason: SDUPTHER

## 2019-04-15 RX ORDER — ASPIRIN 325 MG
TABLET, DELAYED RELEASE (ENTERIC COATED) ORAL
Qty: 30 TAB | Refills: 5 | Status: SHIPPED | OUTPATIENT
Start: 2019-04-15 | End: 2019-06-19 | Stop reason: SDUPTHER

## 2019-04-15 RX ORDER — AMITRIPTYLINE HYDROCHLORIDE 25 MG/1
TABLET, FILM COATED ORAL
Qty: 30 TAB | Refills: 5 | Status: SHIPPED | OUTPATIENT
Start: 2019-04-15 | End: 2019-06-19 | Stop reason: SDUPTHER

## 2019-04-19 DIAGNOSIS — F41.9 ANXIETY DISORDER, UNSPECIFIED TYPE: ICD-10-CM

## 2019-04-19 RX ORDER — VENLAFAXINE HYDROCHLORIDE 75 MG/1
CAPSULE, EXTENDED RELEASE ORAL
Qty: 30 CAP | Refills: 5 | Status: SHIPPED | OUTPATIENT
Start: 2019-04-19 | End: 2019-06-19 | Stop reason: SDUPTHER

## 2019-05-06 ENCOUNTER — DOCUMENTATION ONLY (OUTPATIENT)
Dept: INTERNAL MEDICINE CLINIC | Age: 49
End: 2019-05-06

## 2019-06-10 DIAGNOSIS — F41.9 ANXIETY DISORDER, UNSPECIFIED TYPE: ICD-10-CM

## 2019-06-10 DIAGNOSIS — R39.15 URINARY URGENCY: ICD-10-CM

## 2019-06-10 NOTE — TELEPHONE ENCOUNTER
FYI - Pt called to let Dr Shai Wilder / office know that [address] will be calling office to request rxs for pt, pt is switching to them for her pharmacy.

## 2019-06-19 RX ORDER — ATORVASTATIN CALCIUM 20 MG/1
TABLET, FILM COATED ORAL
Qty: 30 TAB | Refills: 5 | Status: SHIPPED | OUTPATIENT
Start: 2019-06-19 | End: 2019-10-21 | Stop reason: SDUPTHER

## 2019-06-19 RX ORDER — AMITRIPTYLINE HYDROCHLORIDE 25 MG/1
TABLET, FILM COATED ORAL
Qty: 30 TAB | Refills: 5 | Status: SHIPPED | OUTPATIENT
Start: 2019-06-19 | End: 2019-10-21 | Stop reason: SDUPTHER

## 2019-06-19 RX ORDER — VENLAFAXINE HYDROCHLORIDE 75 MG/1
CAPSULE, EXTENDED RELEASE ORAL
Qty: 30 CAP | Refills: 5 | Status: SHIPPED | OUTPATIENT
Start: 2019-06-19 | End: 2019-10-21 | Stop reason: SDUPTHER

## 2019-06-19 RX ORDER — ASPIRIN 325 MG
TABLET, DELAYED RELEASE (ENTERIC COATED) ORAL
Qty: 30 TAB | Refills: 5 | Status: SHIPPED | OUTPATIENT
Start: 2019-06-19 | End: 2019-10-21 | Stop reason: SDUPTHER

## 2019-10-21 ENCOUNTER — OFFICE VISIT (OUTPATIENT)
Dept: INTERNAL MEDICINE CLINIC | Age: 49
End: 2019-10-21

## 2019-10-21 VITALS
SYSTOLIC BLOOD PRESSURE: 129 MMHG | DIASTOLIC BLOOD PRESSURE: 82 MMHG | WEIGHT: 191.8 LBS | TEMPERATURE: 97.7 F | HEART RATE: 90 BPM | BODY MASS INDEX: 32.74 KG/M2 | HEIGHT: 64 IN | OXYGEN SATURATION: 99 % | RESPIRATION RATE: 16 BRPM

## 2019-10-21 DIAGNOSIS — K50.119 CROHN'S DISEASE OF COLON WITH COMPLICATION (HCC): ICD-10-CM

## 2019-10-21 DIAGNOSIS — F33.41 RECURRENT MAJOR DEPRESSIVE DISORDER, IN PARTIAL REMISSION (HCC): ICD-10-CM

## 2019-10-21 DIAGNOSIS — Z23 ENCOUNTER FOR IMMUNIZATION: ICD-10-CM

## 2019-10-21 DIAGNOSIS — I63.10 CEREBROVASCULAR ACCIDENT (CVA) DUE TO EMBOLISM OF PRECEREBRAL ARTERY (HCC): ICD-10-CM

## 2019-10-21 DIAGNOSIS — Z00.00 WELL ADULT EXAM: Primary | ICD-10-CM

## 2019-10-21 DIAGNOSIS — E78.5 HYPERLIPIDEMIA, UNSPECIFIED HYPERLIPIDEMIA TYPE: ICD-10-CM

## 2019-10-21 DIAGNOSIS — Z72.0 TOBACCO USE: ICD-10-CM

## 2019-10-21 DIAGNOSIS — G81.94 LEFT HEMIPARESIS (HCC): ICD-10-CM

## 2019-10-21 DIAGNOSIS — Z12.39 SCREENING FOR BREAST CANCER: ICD-10-CM

## 2019-10-21 DIAGNOSIS — F45.41 STRESS HEADACHES: ICD-10-CM

## 2019-10-21 DIAGNOSIS — R76.8 ANTI-TPO ANTIBODIES PRESENT: ICD-10-CM

## 2019-10-21 DIAGNOSIS — K52.9 INFLAMMATORY BOWEL DISEASES (IBD): ICD-10-CM

## 2019-10-21 RX ORDER — ATORVASTATIN CALCIUM 20 MG/1
TABLET, FILM COATED ORAL
Qty: 30 TAB | Refills: 11 | Status: SHIPPED | OUTPATIENT
Start: 2019-10-21 | End: 2019-12-27 | Stop reason: SDUPTHER

## 2019-10-21 RX ORDER — TOPIRAMATE 200 MG/1
TABLET ORAL
COMMUNITY
Start: 2019-05-03 | End: 2019-10-21

## 2019-10-21 RX ORDER — AMITRIPTYLINE HYDROCHLORIDE 25 MG/1
TABLET, FILM COATED ORAL
Qty: 30 TAB | Refills: 11 | Status: SHIPPED | OUTPATIENT
Start: 2019-10-21 | End: 2019-12-27 | Stop reason: SDUPTHER

## 2019-10-21 RX ORDER — VENLAFAXINE HYDROCHLORIDE 75 MG/1
CAPSULE, EXTENDED RELEASE ORAL
Qty: 30 CAP | Refills: 11 | Status: SHIPPED | OUTPATIENT
Start: 2019-10-21 | End: 2020-10-15

## 2019-10-21 RX ORDER — ASPIRIN 325 MG
TABLET, DELAYED RELEASE (ENTERIC COATED) ORAL
Qty: 30 TAB | Refills: 11 | Status: SHIPPED | OUTPATIENT
Start: 2019-10-21 | End: 2019-12-27 | Stop reason: SDUPTHER

## 2019-10-21 NOTE — PROGRESS NOTES
RM 1    Chief Complaint   Patient presents with    Well Woman     annual appointmenet     Other     renewed OT and PT Orders      1. Have you been to the ER, urgent care clinic since your last visit? Hospitalized since your last visit? [facility] for knee pain -    2. Have you seen or consulted any other health care providers outside of the [address] [place] [address] since your last visit? Include any pap smears or colon screening.  [facility] Dr. Merritt Melchor Maintenance Due   Topic Date Due    Shingrix Vaccine Age 50> (1 of 2) 11/08/2018    FOBT Q 1 YEAR AGE 50-75  11/08/2018    Influenza Age 5 to Adult  08/01/2019     Patient declines flu shot today     Learning Assessment 10/23/2018   PRIMARY LEARNER Patient   HIGHEST LEVEL OF EDUCATION - PRIMARY LEARNER  GRADUATED HIGH SCHOOL OR GED   BARRIERS PRIMARY LEARNER NONE   CO-LEARNER CAREGIVER No   PRIMARY LANGUAGE ENGLISH   LEARNER PREFERENCE PRIMARY DEMONSTRATION   ANSWERED BY patient   RELATIONSHIP SELF

## 2019-10-21 NOTE — PROGRESS NOTES
HPI   Sher Simmons is a 46 y.o. female, she presents today for:    46year old woman, history of emoblic stroke in 9169, crohn's disease. Neurologist Dr. Marjorie Muñoz. At time of visit unable to review records due to IT \" problem\". Patient reports he has advised her she doesn't need to come back. Has completed PT/OT with Johan Velez. Including left arm weakness, has a brace on left leg. Finds that current brace is for foot drop. Notes that she has had some improvement in     Reports that she has a special vehicle to get out and drive. Does not like going out and seeing other. Venlafaxine for 75mg for depression. Taking amitrypitline for heads. Following with GI, last visit was 6 months ago. PMH/PSH: reviewed and updated  Sochx:  reports that she has quit smoking. She has a 3.75 pack-year smoking history. She has never used smokeless tobacco. She reports that she does not drink alcohol or use drugs. Famhx: reviewed and updated     All: Allergies   Allergen Reactions    Remicade [Infliximab] Other (comments)     Throat closes up per pt     Med:   Current Outpatient Medications   Medication Sig    adalimumab 40 mg/0.4 mL sykt See Instructions, Crohn's:  160 mg Day 1, then 80 mg Day 15. Day 29 begin 40mg every other week. , # 1 kit(s), 0 Refills, Pharmacy: [facility]    amitriptyline (ELAVIL) 25 mg tablet TAKE ONE TABLET BY MOUTH ONCE NIGHTLY    atorvastatin (LIPITOR) 20 mg tablet TAKE ONE TABLET BY MOUTH DAILY    aspirin delayed-release 325 mg tablet TAKE ONE TABLET BY MOUTH DAILY    venlafaxine-SR (EFFEXOR-XR) 75 mg capsule TAKE ONE CAPSULE BY MOUTH DAILY     No current facility-administered medications for this visit. Review of Systems   Constitutional: Negative for chills, fever and malaise/fatigue. Respiratory: Negative for shortness of breath. Cardiovascular: Negative for chest pain. Psychiatric/Behavioral: Negative for depression.      PE:  Blood pressure 129/82, pulse 90, temperature 97.7 °F (36.5 °C), temperature source Oral, resp. rate 16, height 5' 4.02\" (1.626 m), weight 191 lb 12.8 oz (87 kg), SpO2 99 %. Body mass index is 32.9 kg/m². Physical Exam   Constitutional: She is oriented to person, place, and time. She appears well-developed and well-nourished. No distress. HENT:   Head: Normocephalic. Mouth/Throat: Oropharynx is clear and moist.   Eyes: Conjunctivae and EOM are normal.   Neck: Neck supple. Cardiovascular: Normal rate, regular rhythm and normal heart sounds. Pulmonary/Chest: Effort normal and breath sounds normal.   Neurological: She is alert and oriented to person, place, and time. Left arm with 1/5 strength. Left leg with brace in place for foot drop. Contracture of left hand including 2nd digit. Skin: Skin is warm and dry. Nursing note and vitals reviewed. Labs:   No results found for any visits on 10/21/19. A/P:  46 y.o. female    ICD-10-CM ICD-9-CM    1. Crohn's disease of colon with complication ([facility] A79.762 321.8 METABOLIC PANEL, COMPREHENSIVE      VITAMIN B12   2. Cerebrovascular accident (CVA) due to embolism of precerebral artery ([facility] I63.10 434.11 REFERRAL TO PHYSICAL THERAPY      REFERRAL TO OCCUPATIONAL THERAPY      REFERRAL TO PSYCHOLOGY      LIPID PANEL      METABOLIC PANEL, COMPREHENSIVE   3. Left hemiparesis (HCC) G81.94 342.90 REFERRAL TO PHYSICAL THERAPY      REFERRAL TO OCCUPATIONAL THERAPY      REFERRAL TO PSYCHOLOGY      aspirin delayed-release 325 mg tablet      VITAMIN B12   4. Anti-TPO antibodies present R76.8 795.79 TSH 3RD GENERATION      VITAMIN B12   5. Recurrent major depressive disorder, in partial remission (HCC) F33.41 296.35 venlafaxine-SR (EFFEXOR-XR) 75 mg capsule      VITAMIN B12   6. Stress headaches F45.41 307.81 amitriptyline (ELAVIL) 25 mg tablet      VITAMIN B12   7.  Encounter for immunization Z23 V03.89 INFLUENZA VIRUS VAC QUAD,SPLIT,PRESV FREE SYRINGE IM      HEPATITIS A VACCINE, ADULT DOSAGE, IM      HEPATITIS B VACCINE, ADULT DOSAGE, IM   8. Inflammatory bowel diseases (IBD) K52.9 558.9    9. Hyperlipidemia, unspecified hyperlipidemia type E78.5 272.4 LIPID PANEL      atorvastatin (LIPITOR) 20 mg tablet   10. Tobacco use Z72.0 305.1    11. Screening for breast cancer Z12.39 V76.10 PETROS MAMMO BI SCREENING INCL CAD    Well woman (non-gyn) exam: history and exam revealed issues as noted below. Cancer screening: mammo ordered. C-scope with MCV and cervical cancer screening up to date. Vaccine status: flu, hep A and hep B provided. Cardiovascular risk: history of stroke see below  Bone health: daily vit D supplement encouarged  Diet and Exercise: to discuss increasing exercise with [facility] BP    CVA: taking aspirin and lipitor.    - referrals to PT, OT and psychology. Tobacco abuse: reports that for a year she went without cigarettes. Has recently resumed 1-2 hear or there. Agrees that she will look to stop again. HLD: reports she has added in more fruits and vegetables. And less eating out. IBD: follows with Dr. Jesus Iniguez at [facility]. Has not yet scheduled follow-up. Advised to do so    Headache: on amitriptyline, helping, discussed weaning off in the future. Patient notes it helps with sleep. MDD: chronic, prior to stroke as well. Currently having restricted activity out of house because of anxiety and adjustment to stroke. - She was given AVS and expressed understanding with the diagnosis and plan as discussed.     Future Appointments   Date Time Provider Gary Kate   10/21/2019  9:30 AM Tobias Dorantes MD [address] [facility]

## 2019-10-21 NOTE — PATIENT INSTRUCTIONS
Low cost dental care in [facility]) [phone] [email or web address]    Call and schedule with GI. Recovering From Depression: Care Instructions  Your Care Instructions    Taking good care of yourself is important as you recover from depression. In time, your symptoms will fade as your treatment takes hold. Do not give up. Instead, focus your energy on getting better. Your mood will improve. It just takes some time. Focus on things that can help you feel better, such as being with friends and family, eating well, and getting enough rest. But take things slowly. Do not do too much too soon. You will begin to feel better gradually. Follow-up care is a key part of your treatment and safety. Be sure to make and go to all appointments, and call your doctor if you are having problems. It's also a good idea to know your test results and keep a list of the medicines you take. How can you care for yourself at home? Be realistic  · If you have a large task to do, break it up into smaller steps you can handle, and just do what you can. · You may want to put off important decisions until your depression has lifted. If you have plans that will have a major impact on your life, such as marriage, divorce, or a job change, try to wait a bit. Talk it over with friends and loved ones who can help you look at the overall picture first.  · Reaching out to people for help is important. Do not isolate yourself. Let your family and friends help you. Find someone you can trust and confide in, and talk to that person. · Be patient, and be kind to yourself. Remember that depression is not your fault and is not something you can overcome with willpower alone. Treatment is necessary for depression, just like for any other illness. Feeling better takes time, and your mood will improve little by little. Stay active  · Stay busy and get outside. Take a walk, or try some other light exercise. · Talk with your doctor about an exercise program. Exercise can help with mild depression. · Go to a movie or concert. Take part in a Scientologist activity or other social gathering. Go to a ball game. · Ask a friend to have dinner with you. Take care of yourself  · Eat a balanced diet with plenty of fresh fruits and vegetables, whole grains, and lean protein. If you have lost your appetite, eat small snacks rather than large meals. · Avoid drinking alcohol or using illegal drugs. Do not take medicines that have not been prescribed for you. They may interfere with medicines you may be taking for depression, or they may make your depression worse. · Take your medicines exactly as they are prescribed. You may start to feel better within 1 to 3 weeks of taking antidepressant medicine. But it can take as many as 6 to 8 weeks to see more improvement. If you have questions or concerns about your medicines, or if you do not notice any improvement by 3 weeks, talk to your doctor. · If you have any side effects from your medicine, tell your doctor. Antidepressants can make you feel tired, dizzy, or nervous. Some people have dry mouth, constipation, headaches, sexual problems, or diarrhea. Many of these side effects are mild and will go away on their own after you have been taking the medicine for a few weeks. Some may last longer. Talk to your doctor if side effects are bothering you too much. You might be able to try a different medicine. · Get enough sleep. If you have problems sleeping:  ? Go to bed at the same time every night, and get up at the same time every morning. ? Keep your bedroom dark and quiet. ? Do not exercise after 5:00 p.m.  ? Avoid drinks with caffeine after 5:00 p.m. · Avoid sleeping pills unless they are prescribed by the doctor treating your depression.  Sleeping pills may make you groggy during the day, and they may interact with other medicine you are taking. · If you have any other illnesses, such as diabetes, heart disease, or high blood pressure, make sure to continue with your treatment. Tell your doctor about all of the medicines you take, including those with or without a prescription. · Keep the numbers for these national suicide hotlines: 6-380-249-TALK (8-572.836.5326) and 0-088-JSTPIWZ (8-508.564.1093). If you or someone you know talks about suicide or feeling hopeless, get help right away. When should you call for help? Call 911 anytime you think you may need emergency care. For example, call if:    · You feel like hurting yourself or someone else.     · Someone you know has depression and is about to attempt or is attempting suicide.   [facility] your doctor now or seek immediate medical care if:    · You hear voices.     · Someone you know has depression and:  ? Starts to give away his or her possessions. ? Uses illegal drugs or drinks alcohol heavily. ? Talks or writes about death, including writing suicide notes or talking about guns, knives, or pills. ? Starts to spend a lot of time alone. ? Acts very aggressively or suddenly appears calm.    Watch closely for changes in your health, and be sure to contact your doctor if:    · You do not get better as expected. Where can you learn more? Go to http://lamonte-maría.info/. Enter Q900 in the search box to learn more about \"Recovering From Depression: Care Instructions. \"  Current as of: May 28, 2019  Content Version: 12.2  © 5339-0323 [employer]. Care instructions adapted under license by [employer] (which disclaims liability or warranty for this information). If you have questions about a medical condition or this instruction, always ask your healthcare professional. [address] any warranty or liability for your use of this information.

## 2019-10-22 LAB
ALBUMIN SERPL-MCNC: 4.5 G/DL (ref 3.5–5.5)
ALBUMIN/GLOB SERPL: 1.5 {RATIO} (ref 1.2–2.2)
ALP SERPL-CCNC: 77 IU/L (ref 39–117)
ALT SERPL-CCNC: 14 IU/L (ref 0–32)
AST SERPL-CCNC: 16 IU/L (ref 0–40)
BILIRUB SERPL-MCNC: 0.4 MG/DL (ref 0–1.2)
BUN SERPL-MCNC: 13 MG/DL (ref 6–24)
BUN/CREAT SERPL: 13 (ref 9–23)
CALCIUM SERPL-MCNC: 9.2 MG/DL (ref 8.7–10.2)
CHLORIDE SERPL-SCNC: 102 MMOL/L (ref 96–106)
CHOLEST SERPL-MCNC: 167 MG/DL (ref 100–199)
CO2 SERPL-SCNC: 20 MMOL/L (ref 20–29)
CREAT SERPL-MCNC: 0.99 MG/DL (ref 0.57–1)
GLOBULIN SER CALC-MCNC: 3 G/DL (ref 1.5–4.5)
GLUCOSE SERPL-MCNC: 77 MG/DL (ref 65–99)
HDLC SERPL-MCNC: 64 MG/DL
LDLC SERPL CALC-MCNC: 79 MG/DL (ref 0–99)
POTASSIUM SERPL-SCNC: 4.4 MMOL/L (ref 3.5–5.2)
PROT SERPL-MCNC: 7.5 G/DL (ref 6–8.5)
SODIUM SERPL-SCNC: 141 MMOL/L (ref 134–144)
TRIGL SERPL-MCNC: 120 MG/DL (ref 0–149)
TSH SERPL DL<=0.005 MIU/L-ACNC: 2.88 UIU/ML (ref 0.45–4.5)
VIT B12 SERPL-MCNC: 315 PG/ML (ref 232–1245)
VLDLC SERPL CALC-MCNC: 24 MG/DL (ref 5–40)

## 2019-10-22 NOTE — PROGRESS NOTES
Labs are in normal ranges. Normal but low end range vitamin b12. Please be sure to eat a diet rich in b12, or if this is not possible, consider adding a b12 supplement.

## 2019-12-03 NOTE — TELEPHONE ENCOUNTER
----- Message from Angela Portillo sent at 12/3/2019  1:19 PM EST -----  Regarding: Dr. Daysi Aponte Message/Vendor Calls    Caller's first and last name:Elise Madrigal(Sheryl splint)      Reason for call:status of order      Callback required yes/no and why:yes, status of order      Best contact number(s): [phone]      Details to clarify the request:Elise stated a detailed written order was sent on  11/20/19 for 2 most recent office notes and a hand splint for the wrist and would like a call back with the status.       Angela Portillo

## 2019-12-04 NOTE — TELEPHONE ENCOUNTER
Order sent to pharmacy listed and also printed if that is what was needed. Please fax along with last 2 office noted as the prior faxed request stated.

## 2019-12-04 NOTE — TELEPHONE ENCOUNTER
Office notes from last 2 visits, and rx for brace faxed to [facility]. Fax# [phone]  Confirmation received.

## 2019-12-26 DIAGNOSIS — E78.5 HYPERLIPIDEMIA, UNSPECIFIED HYPERLIPIDEMIA TYPE: ICD-10-CM

## 2019-12-26 DIAGNOSIS — G81.94 LEFT HEMIPARESIS (HCC): ICD-10-CM

## 2019-12-26 DIAGNOSIS — F45.41 STRESS HEADACHES: ICD-10-CM

## 2019-12-27 RX ORDER — ASPIRIN 325 MG
TABLET, DELAYED RELEASE (ENTERIC COATED) ORAL
Qty: 30 TAB | Refills: 11 | Status: SHIPPED | OUTPATIENT
Start: 2019-12-27 | End: 2020-11-04

## 2019-12-27 RX ORDER — ATORVASTATIN CALCIUM 20 MG/1
TABLET, FILM COATED ORAL
Qty: 30 TAB | Refills: 11 | Status: SHIPPED | OUTPATIENT
Start: 2019-12-27 | End: 2020-11-04

## 2019-12-27 RX ORDER — AMITRIPTYLINE HYDROCHLORIDE 25 MG/1
TABLET, FILM COATED ORAL
Qty: 30 TAB | Refills: 11 | Status: SHIPPED | OUTPATIENT
Start: 2019-12-27 | End: 2020-11-04

## 2020-04-10 ENCOUNTER — E-VISIT (OUTPATIENT)
Dept: INTERNAL MEDICINE CLINIC | Age: 50
End: 2020-04-10

## 2020-04-13 ENCOUNTER — TELEPHONE (OUTPATIENT)
Dept: INTERNAL MEDICINE CLINIC | Age: 50
End: 2020-04-13

## 2020-05-04 RX ORDER — PREDNISONE 20 MG/1
40 TABLET ORAL DAILY
Refills: 0 | Status: CANCELLED | OUTPATIENT
Start: 2020-05-04

## 2020-05-04 NOTE — TELEPHONE ENCOUNTER
Prednisone (in this case) is not a scheduled or routine medication. Decline \"refill\". Please encourage virtual visit appt to assess her symptoms and appropriate recommendation.

## 2020-05-04 NOTE — TELEPHONE ENCOUNTER
Patient is requesting a refill on Prednisone. Historical medication. No notes in last office visit with Dr. Nano Tamayo of patient taking Prednisone. Please review and sign if appropriate. Thank you. Last visit 04/10/2020 E-visit NP Briseida Holding   Next appointment None     Requested Prescriptions     Pending Prescriptions Disp Refills    predniSONE (DELTASONE) 20 mg tablet  0     Sig: Take 40 mg by mouth daily.

## 2020-05-07 ENCOUNTER — VIRTUAL VISIT (OUTPATIENT)
Dept: INTERNAL MEDICINE CLINIC | Age: 50
End: 2020-05-07

## 2020-05-07 DIAGNOSIS — R19.7 DIARRHEA, UNSPECIFIED TYPE: ICD-10-CM

## 2020-05-07 DIAGNOSIS — K50.119 CROHN'S DISEASE OF COLON WITH COMPLICATION (HCC): Primary | ICD-10-CM

## 2020-05-07 DIAGNOSIS — K51.919 ULCERATIVE COLITIS WITH COMPLICATION, UNSPECIFIED LOCATION (HCC): ICD-10-CM

## 2020-05-07 DIAGNOSIS — K52.9 INFLAMMATORY BOWEL DISEASES (IBD): ICD-10-CM

## 2020-05-07 DIAGNOSIS — K52.9 COLITIS: ICD-10-CM

## 2020-05-07 RX ORDER — ADALIMUMAB 40MG/0.4ML
KIT SUBCUTANEOUS
COMMUNITY
Start: 2020-05-05 | End: 2020-12-24 | Stop reason: ALTCHOICE

## 2020-05-07 RX ORDER — TOPIRAMATE 25 MG/1
TABLET ORAL
COMMUNITY
Start: 2020-04-18 | End: 2022-07-21

## 2020-05-07 RX ORDER — OMEPRAZOLE 40 MG/1
CAPSULE, DELAYED RELEASE ORAL
COMMUNITY
Start: 2020-04-21 | End: 2022-07-21

## 2020-05-07 NOTE — PATIENT INSTRUCTIONS
Yadi Murray, [address] [place] [address] [phone] Phone:  [phone] Tyson Cash Please follow the following instructions to process/authorize your referral, if needed: 
 
Referrals processing Please verify with your insurance IF you need referral authorization submitted. For insurance plans which require this, please follow the following steps. FAILURE TO DO SO MAY RESULT IN INABILITY TO SEE THE SPECIALIST YOU HAVE BEEN REFERRED TO (once you are scheduled to see them). 1. Call and schedule appointment with specialist 
2. Call our clinic and leave message with provider name, and date of appointment 3. We will then submit the referral to your insurance. This process takes 2-5 business days. If you have questions about scheduling or authorizing referral, you can review with our referral coordinator Margaret Grubbs). You can review with her today if available/if you have time, or you can call to review once you have made your referral/appointment. If you are not sure if you need referral authorizations, please review with the referral coordinator(s), either prior to or after you have made the appointment, as reviewed.

## 2020-05-07 NOTE — PROGRESS NOTES
Chief Complaint   Patient presents with    Diarrhea     x 1 month 15-18x's daily; related to colitis pt states. Immodium not helping. Started Humira 7 months ago, but has caused psoriasis and not longer helping with diarrhea    Medication Management     patient would like to try steroids again    Referral Request     needs a new GI specialist     Patient is having to wear depends due to being unable to make it to the bathroom. 1. Have you been to the ER, urgent care clinic since your last visit? Hospitalized since your last visit? Yes When: 4/2020 Where: [facility] Reason for visit: Fall, bruised ribs    2. Have you seen or consulted any other health care providers outside of the [address] [place] [address] since your last visit? Include any pap smears or colon screening. No    Health Maintenance Due   Topic Date Due    Shingrix Vaccine Age 49> (1 of 2) 11/08/2018       3 most recent PHQ Screens 7/14/2016   Little interest or pleasure in doing things Several days   Feeling down, depressed, irritable, or hopeless Several days   Total Score PHQ 2 2     Recent Travel Screening and Travel History documentation     Travel Screening       Question Response     In the last month, have you been in contact with someone who was confirmed or suspected to have Coronavirus / COVID-19? No / Unsure     Do you have any of the following symptoms? None of these;Diarrhea     Have you traveled internationally in the last month?  No      Travel History   Travel since 04/07/20     No documented travel since 04/07/20

## 2020-05-07 NOTE — PROGRESS NOTES
Cece Hansen is a 44 y.o. female who was seen by synchronous (real-time) audio-video technology on 5/7/2020. Consent:  She and/or her healthcare decision maker is aware that this patient-initiated Telehealth encounter is a billable service, with coverage as determined by her insurance carrier. She is aware that she may receive a bill and has provided verbal consent to proceed: Yes    I was in the office while conducting this encounter. Subjective:   Cece Hansen was seen for Diarrhea (x 1 month 15-18x's daily; related to colitis pt states. Immodium not helping. Started Humira 7 months ago, but has caused psoriasis and not longer helping with diarrhea); Medication Management (patient would like to try steroids again); and Referral Request (needs a new GI specialist)      Notes:  She had submitted a question for E-visit for diarrhea, and a subsequent refill request for prednisone earlier in April but pt had not scheduled to be seen until now. She has had problems with her GI provider. Problems getting called back for appt. She has had problems getting appt with that provider--at [facility]. She has called, but not getting call back. She had seen Dr. Richi Mcbride in the past.  She wanted someone who will spend more time with her. She would prefer a closer location than [facility]. Reviewed [facility] location with Dr. Joshua Delgado, which is her preferred location. Provided phone number and location toschedule. Sent to pt in [address] also, as reviewed. Reviewed would defer anti-motility mgt with prescription med like Lomotil or steroid, or change in Humira, to GI provider, for appropriate colitis mgt. She is agreeable with that plan and plans to call then as reviewed. Nursing screenings reviewed by provider at visit.        Past Medical History:   Diagnosis Date    Arthritis     neck, back, hips & knees    Cerebrovascular accident (CVA) due to embolism of precerebral artery ([facility] 10/23/2018    Chronic fatigue     Colitis     [facility]'s colitis [facility] [address] [facility]     De Quervain's tenosynovitis, bilateral     Depression     Fibromyalgia     Patellofemoral syndrome of both knees     Pedal edema     Radial styloid fracture      Past Surgical History:   Procedure Laterality Date    HX WISDOM TEETH EXTRACTION      MI COLSC FLX W/NDSC US XM RCTM ET AL LMTD&ADJ STRUX         Allergies   Allergen Reactions    Remicade [Infliximab] Other (comments)     Throat closes up per pt       Prior to Admission medications    Medication Sig Start Date End Date Taking? Authorizing Provider   CATHERINE Ng, Pen 40 mg/0.4 mL injection pen  5/5/20  Yes Provider, Historical   topiramate (TOPAMAX) 25 mg tablet  4/18/20  Yes Provider, Historical   aspirin delayed-release 325 mg tablet TAKE 1 TABLET BY MOUTH EVERY DAY 12/27/19  Yes Shanita Hernandez MD   amitriptyline (ELAVIL) 25 mg tablet TAKE 1 TABLET BY MOUTH AT BEDTIME 12/27/19  Yes Shanita Hernandez MD   atorvastatin (LIPITOR) 20 mg tablet TAKE 1 TABLET BY MOUTH AT BEDTIME 12/27/19  Yes Shanita Hernandez MD   venlafaxine-SR [facility] 75 mg capsule TAKE ONE CAPSULE BY MOUTH DAILY 10/21/19  Yes Netta Delgado MD   omeprazole (PRILOSEC) 40 mg capsule  4/21/20   Provider, Historical   Arm Brace (WRIST BRACE) misc 1 Each by Does Not Apply route daily. 12/3/19   Shanita Hernandez MD         ROS    PHYSICAL EXAMINATION:    Vital Signs: There were no vitals taken for this visit.      Constitutional: [x] Appears well-developed and well-nourished [x] No apparent distress      Mental status: [x] Alert and awake  [x] Oriented [x] Able to follow commands       Eyes:   EOM    [x]  Normal      Sclera  [x]  Normal              Discharge [x]  None visible       HENT: [x] Normocephalic, atraumatic    [x] Mouth/Throat: Mucous membranes are moist    External Ears [x] Normal      Neck: [x] No visualized mass     Pulmonary/Chest: [x] Respiratory effort normal   [x] No visualized signs of difficulty breathing or respiratory distress    Musculoskeletal:  [x] Normal range of motion of neck    Neurological:        [x] No Facial Asymmetry (Cranial nerve 7 motor function) (limited exam due to video visit)          [x] No gaze palsy     Skin:        [x] No significant exanthematous lesions or discoloration noted on facial skin             Psychiatric:       [x] Normal Affect       Other pertinent observable physical exam findings:  None. We discussed the expected course, resolution and complications of the diagnosis(es) in detail. Medication risks, benefits, costs, interactions, and alternatives were discussed as indicated. I advised her to contact the office if her condition worsens, changes or fails to improve as anticipated. She expressed understanding with the diagnosis(es) and plan. Pursuant to the emergency declaration under the [address] [place] [address] waiver authority and the [employer] Act, this Virtual  Visit was conducted, with patient's consent, to reduce the patient's risk of exposure to COVID-19 and provide continuity of care for an established patient. Services were provided through a video synchronous discussion virtually to substitute for in-person clinic visit. Assessment & Plan:   Diagnoses and all orders for this visit:      ICD-10-CM ICD-9-CM    1. Cro[facility]'s disease of colon with complication ([facility] L62.561 555.1 REFERRAL TO GASTROENTEROLOGY   2. Inflammatory bowel diseases (IBD) K52.9 558.9 REFERRAL TO GASTROENTEROLOGY   3. Diarrhea, unspecified type R19.7 787.91 REFERRAL TO GASTROENTEROLOGY   4. Ulcerative colitis with complication, unspecified location (HCC) K51.919 556.9 REFERRAL TO GASTROENTEROLOGY   5. Colitis K52.9 558.9        Referral to alternative provider reviewed.     She will contact us if problems scheduling visit to manage diarrhea and colitis and medication problems as reviewed above.      Follow-up and Dispositions    · Return if symptoms worsen or fail to improve. reviewed medications and side effects in detail    For additional documentation of information and/or recommendations discussed this visit, please see notes in instructions. Plan and evaluation (above) reviewed with pt at visit  Patient voiced understanding of plan and provided with time to ask/review questions. After Visit Summary (AVS) provided to pt after visit with additional instructions as needed/reviewed. AVS:  [x]  Sent to patient as [employer] message after visit. []  Mailed to patient after visit. []  Not sent to patient after visit. No future appointments.

## 2020-11-04 DIAGNOSIS — F45.41 STRESS HEADACHES: ICD-10-CM

## 2020-11-04 DIAGNOSIS — E78.5 HYPERLIPIDEMIA, UNSPECIFIED HYPERLIPIDEMIA TYPE: ICD-10-CM

## 2020-11-04 DIAGNOSIS — G81.94 LEFT HEMIPARESIS (HCC): ICD-10-CM

## 2020-11-04 RX ORDER — ASPIRIN 325 MG
TABLET, DELAYED RELEASE (ENTERIC COATED) ORAL
Qty: 30 TAB | Refills: 5 | Status: SHIPPED | OUTPATIENT
Start: 2020-11-04 | End: 2021-03-31

## 2020-11-04 RX ORDER — ATORVASTATIN CALCIUM 20 MG/1
TABLET, FILM COATED ORAL
Qty: 30 TAB | Refills: 5 | Status: SHIPPED | OUTPATIENT
Start: 2020-11-04 | End: 2021-03-31

## 2020-11-04 RX ORDER — AMITRIPTYLINE HYDROCHLORIDE 25 MG/1
TABLET, FILM COATED ORAL
Qty: 30 TAB | Refills: 5 | Status: SHIPPED | OUTPATIENT
Start: 2020-11-04 | End: 2020-11-19

## 2020-11-19 DIAGNOSIS — F45.41 STRESS HEADACHES: ICD-10-CM

## 2020-11-19 RX ORDER — AMITRIPTYLINE HYDROCHLORIDE 25 MG/1
TABLET, FILM COATED ORAL
Qty: 30 TAB | Refills: 5 | Status: SHIPPED | OUTPATIENT
Start: 2020-11-19 | End: 2021-09-09

## 2020-12-22 DIAGNOSIS — F33.41 RECURRENT MAJOR DEPRESSIVE DISORDER, IN PARTIAL REMISSION (HCC): ICD-10-CM

## 2020-12-23 NOTE — TELEPHONE ENCOUNTER
Called and scheduled patient for virtual visit with DR. Van tomorrow morning.      Defer refill to this visit    Atiya Jacobsen MD

## 2020-12-24 ENCOUNTER — VIRTUAL VISIT (OUTPATIENT)
Dept: INTERNAL MEDICINE CLINIC | Age: 50
End: 2020-12-24
Payer: MEDICAID

## 2020-12-24 DIAGNOSIS — F41.8 DEPRESSION WITH ANXIETY: Primary | ICD-10-CM

## 2020-12-24 DIAGNOSIS — Z12.31 ENCOUNTER FOR SCREENING MAMMOGRAM FOR BREAST CANCER: ICD-10-CM

## 2020-12-24 DIAGNOSIS — I63.10 CEREBROVASCULAR ACCIDENT (CVA) DUE TO EMBOLISM OF PRECEREBRAL ARTERY (HCC): ICD-10-CM

## 2020-12-24 DIAGNOSIS — K50.119 CROHN'S DISEASE OF COLON WITH COMPLICATION (HCC): ICD-10-CM

## 2020-12-24 DIAGNOSIS — K51.919 ULCERATIVE COLITIS WITH COMPLICATION, UNSPECIFIED LOCATION (HCC): ICD-10-CM

## 2020-12-24 DIAGNOSIS — E55.9 VITAMIN D DEFICIENCY: ICD-10-CM

## 2020-12-24 DIAGNOSIS — R73.9 HYPERGLYCEMIA: ICD-10-CM

## 2020-12-24 DIAGNOSIS — G81.94 LEFT HEMIPARESIS (HCC): ICD-10-CM

## 2020-12-24 DIAGNOSIS — E53.8 B12 DEFICIENCY: ICD-10-CM

## 2020-12-24 DIAGNOSIS — E78.5 HYPERLIPIDEMIA, UNSPECIFIED HYPERLIPIDEMIA TYPE: ICD-10-CM

## 2020-12-24 DIAGNOSIS — R76.8 ANTI-TPO ANTIBODIES PRESENT: ICD-10-CM

## 2020-12-24 DIAGNOSIS — K52.9 INFLAMMATORY BOWEL DISEASES (IBD): ICD-10-CM

## 2020-12-24 DIAGNOSIS — M79.7 FIBROMYALGIA: ICD-10-CM

## 2020-12-24 PROCEDURE — 99214 OFFICE O/P EST MOD 30 MIN: CPT | Performed by: INTERNAL MEDICINE

## 2020-12-24 RX ORDER — VENLAFAXINE HYDROCHLORIDE 75 MG/1
CAPSULE, EXTENDED RELEASE ORAL
Qty: 90 CAP | Refills: 3 | Status: SHIPPED | OUTPATIENT
Start: 2020-12-24 | End: 2021-11-10

## 2020-12-24 NOTE — PROGRESS NOTES
Subjective:  
44 y.o. female for Well Woman Check. Her gyne and breast care is done elsewhere by her Ob-Gyne physician. {Choose one or more SmartLinks; press DELETE if none desired:[id number]} {Choose one or more Last Lab values; press DELETE if none desired:[id number]} ROS: Feeling generally well. No TIA's or unusual headaches, no dysphagia. No prolonged cough. No dyspnea or chest pain on exertion. No abdominal pain, change in bowel habits, black or bloody stools. No urinary tract symptoms. No new or unusual musculoskeletal symptoms. Specific concerns today: ***. Objective: The patient appears well, alert, oriented x 3, in no distress. There were no vitals taken for this visit. ENT normal.  Neck supple. No adenopathy or thyromegaly. JOSH. Lungs are clear, good air entry, no wheezes, rhonchi or rales. S1 and S2 normal, no murmurs, regular rate and rhythm. Abdomen soft without tenderness, guarding, mass or organomegaly. Extremities show no edema, normal peripheral pulses. Neurological is normal, no focal findings. Breast and Pelvic exams are deferred. Assessment/Plan:  
Well Woman 
{plan, general patient:149685} {Assessment and Plan:29732}

## 2020-12-24 NOTE — PROGRESS NOTES
Sharon Mendoza is a 44 y.o. female who was seen by synchronous (real-time) audio-video technology on 12/24/2020. Consent: Sharon Mendoza, who was seen by synchronous (real-time) audio-video technology, and/or her healthcare decision maker, is aware that this patient-initiated, Telehealth encounter on 12/24/2020 is a billable service, with coverage as determined by her insurance carrier. She is aware that she may receive a bill and has provided verbal consent to proceed: Yes. I was in the office while conducting this encounter. Subjective:   Sharon Mendoza was seen for Depression  lipids, GI, etc    Notes:  Pt reports mood is stable overall. Vear Samia with current effexor dose. No SI. Elavil helps her sleep    ASA and statin daily - tolerating well    Dental work in January with tooth extractions planned    Seeing GI at [facility] still  dc'd Humira due to skin changes  Failed Remicade due to throat swelling reaction  No immunomodulating agents at this time  Symptoms are stable on lomotil  Pt reports lomotil has provided better symptom control than any other agent Rx'd to her    Plan for colonoscopy in January    Soft AFO brace from [employer] to wear shoes now. Pt pleased with it, but it was not Rx'd or reviewed by an orthotic specialist    Neuro status stable  Walking at baseline, using left arm some, though limited. Nursing screenings reviewed by provider at visit. Past medical, Social, and Family history reviewed  Medications reviewed and updated. Allergies   Allergen Reactions    Remicade [Infliximab] Other (comments)     Throat closes up per pt       Prior to Admission medications    Medication Sig Start Date End Date Taking?  Authorizing Provider   amitriptyline (ELAVIL) 25 mg tablet TAKE 1 TABLET BY MOUTH AT BEDTIME 11/19/20   Skylar Mares MD   atorvastatin (LIPITOR) 20 mg tablet TAKE 1 TABLET BY MOUTH AT BEDTIME 11/4/20   Skylar Mares MD   aspirin delayed-release 325 mg tablet TAKE 1 TABLET BY MOUTH EVERY DAY 11/4/20   Luis F Issa MD   venlafaxine-SR [facility] 75 mg capsule TAKE 1 CAPSULE BY MOUTH DAILY 10/15/20   MD Hernandez Lora,, Pen 40 mg/0.4 mL injection pen  5/5/20   Provider, Historical   topiramate (TOPAMAX) 25 mg tablet  4/18/20   Provider, Historical   omeprazole (PRILOSEC) 40 mg capsule  4/21/20   Provider, Historical   Arm Brace (WRIST BRACE) misc 1 Each by Does Not Apply route daily. 12/3/19   Luis F Issa MD         ROS     A complete ROS was performed and negative except as noted in HPI     PHYSICAL EXAMINATION:    Vital Signs: There were no vitals taken for this visit. No flowsheet data found. Constitutional: [x] Appears well-developed and well-nourished [x] No apparent distress      Mental status: [x] Alert and awake  [x] Oriented [x] Able to follow commands       Eyes:   EOM    [x]  Normal      Sclera  [x]  Normal              Discharge [x]  None visible       HENT: [x] Normocephalic, atraumatic    [x] Mouth/Throat: Mucous membranes are moist    External Ears [x] Normal      Neck: [x] No visualized mass     Pulmonary/Chest: [x] Respiratory effort normal   [x] No visualized signs of difficulty breathing or respiratory distress    Musculoskeletal:  [x] Normal range of motion of neck    Neurological:        [x] No Facial Asymmetry (Cranial nerve 7 motor function) (limited exam due to video visit)          [x] No gaze palsy     Skin:        [x] No significant exanthematous lesions or discoloration noted on facial skin             Psychiatric:       [x] Normal Affect       Other pertinent observable physical exam findings:  None. Prior labs reviewed. Reviewed prior imaging reports         We discussed the expected course, resolution and complications of the diagnosis(es) in detail. Medication risks, benefits, costs, interactions, and alternatives were discussed as indicated.   I advised her to contact the office if her condition worsens, changes or fails to improve as anticipated. She expressed understanding with the diagnosis(es) and plan. Miguel Ángel Cain is a 44 y.o. female who was evaluated by a video visit encounter for concerns as above. Patient identification was verified prior to start of the visit. A caregiver was present when appropriate. Due to this being a TeleHealth encounter (During [facility]- public health emergency), evaluation of the following organ systems was limited: Vitals/Constitutional/EENT/Resp/CV/GI//MS/Neuro/Skin/Heme-Lymph-Imm. Pursuant to the emergency declaration under the [address] [place] [address] waiver authority and the [employer] Act, this Virtual  Visit was conducted, with patient's (and/or legal guardian's) consent, to reduce the patient's risk of exposure to COVID-19 and provide necessary medical care. Services were provided through a video synchronous discussion virtually to substitute for in-person clinic visit. Assessment & Plan:   Diagnoses and all orders for this visit:      ICD-10-CM ICD-9-CM    1. Depression with anxiety  F41.8 300.4    2. Fibromyalgia  M79.7 729.1    3. Anti-TPO antibodies present  R76.8 795.79 TSH 3RD GENERATION      T4, FREE   4. Inflammatory bowel diseases (IBD)  K52.9 558.9 CBC WITH AUTOMATED DIFF   5. Encounter for screening mammogram for breast cancer  Z12.31 V76.12 PETROS MAMMO BI SCREENING INCL CAD   6. Hyperlipidemia, unspecified hyperlipidemia type  E78.5 579.5 CK      METABOLIC PANEL, COMPREHENSIVE      LIPID PANEL   7. Left hemiparesis (HCC)  G81.94 342.90    8. Crohn's disease of colon with complication ([facility]  S31.346 555.1    9. Ulcerative colitis with complication, unspecified location ([facility]  K51.919 556.9    10. Cerebrovascular accident (CVA) due to embolism of precerebral artery ([facility]  I63.10 434.11    11. Hyperglycemia  R73.9 790.29 HEMOGLOBIN A1C WITH EAG   12.  Vitamin D deficiency  E55.9 268.9 VITAMIN D, 25 HYDROXY   13. B12 deficiency  E53.8 266.2 VITAMIN B12     Follow-up and Dispositions    · Return in about 6 months (around 6/24/2021), or if symptoms worsen or fail to improve, for cholesterol, mood - IO.       results and schedule of future studies reviewed with patient  reviewed diet, exercises and weight   cardiovascular risk and specific lipid/LDL goals reviewed  reviewed medications and side effects in detail     Hold ASA 1 week prior to dental work. encouraged to review AFO with orthotic specialist to maximize the rec's  Fasting labs soon  Vaccines - shingrix here vs pharmacy  Pt declines flu shot  Reviewed COVID vaccine   Encouraged vaccines while off immunomodulating agents  Colonoscopy and GI f/u as scheduled  Consider immunology consult/referral due to apparent allergic responses to biologic agents - probable Ab development against the drugs  Mammogram      AVS:  [x]  Sent to patient as [employer] message after visit. []  Mailed to patient after visit. []  Not sent to patient after visit.

## 2021-02-09 ENCOUNTER — DOCUMENTATION ONLY (OUTPATIENT)
Dept: INTERNAL MEDICINE CLINIC | Age: 51
End: 2021-02-09

## 2021-02-09 NOTE — PROGRESS NOTES
[facility] Urology Incontinence Order form signed 2/8/21 by provider, faxed 2/9/21 to [phone]; order and confirmation placed in bin for central scan.

## 2021-03-31 DIAGNOSIS — E78.5 HYPERLIPIDEMIA, UNSPECIFIED HYPERLIPIDEMIA TYPE: ICD-10-CM

## 2021-03-31 DIAGNOSIS — G81.94 LEFT HEMIPARESIS (HCC): ICD-10-CM

## 2021-03-31 RX ORDER — ASPIRIN 325 MG
TABLET, DELAYED RELEASE (ENTERIC COATED) ORAL
Qty: 30 TAB | Refills: 5 | Status: SHIPPED | OUTPATIENT
Start: 2021-03-31 | End: 2021-09-09

## 2021-03-31 RX ORDER — ATORVASTATIN CALCIUM 20 MG/1
TABLET, FILM COATED ORAL
Qty: 30 TAB | Refills: 5 | Status: SHIPPED | OUTPATIENT
Start: 2021-03-31 | End: 2021-09-09

## 2021-04-23 ENCOUNTER — TELEPHONE (OUTPATIENT)
Dept: INTERNAL MEDICINE CLINIC | Age: 51
End: 2021-04-23

## 2021-04-23 DIAGNOSIS — R10.30 LOWER ABDOMINAL PAIN: ICD-10-CM

## 2021-04-23 NOTE — TELEPHONE ENCOUNTER
Called pt to schedule her Lab appt however during the call pt stated that her Crohn's and Colitis is flaring up. The pt would like if  could call her in something for the nausea that she is experiencing due to the really bad stomach cramp's that she is having at this time. Pt use's the [employer] on [address].

## 2021-04-26 RX ORDER — HYOSCYAMINE SULFATE 0.12 MG/1
0.12 TABLET SUBLINGUAL
Qty: 20 TAB | Refills: 1 | Status: SHIPPED | OUTPATIENT
Start: 2021-04-26 | End: 2022-07-21

## 2021-04-26 RX ORDER — ONDANSETRON 8 MG/1
8 TABLET, ORALLY DISINTEGRATING ORAL
Qty: 20 TAB | Refills: 1 | Status: SHIPPED | OUTPATIENT
Start: 2021-04-26 | End: 2021-12-20 | Stop reason: SDUPTHER

## 2021-04-26 NOTE — TELEPHONE ENCOUNTER
Called pt to advise of medication refill and follow-up with GI specialist. Message was left in secured voice mailbox.

## 2021-04-26 NOTE — TELEPHONE ENCOUNTER
Medication refills authorized. Please clarify that she has contacted her GI provider for evaluation and further management of her Crohn's dz.

## 2021-05-11 ENCOUNTER — TELEPHONE (OUTPATIENT)
Dept: INTERNAL MEDICINE CLINIC | Age: 51
End: 2021-05-11

## 2021-05-11 DIAGNOSIS — K92.2 GASTROINTESTINAL HEMORRHAGE, UNSPECIFIED GASTROINTESTINAL HEMORRHAGE TYPE: Primary | ICD-10-CM

## 2021-05-11 DIAGNOSIS — K29.01 GASTROINTESTINAL HEMORRHAGE ASSOCIATED WITH ACUTE GASTRITIS: ICD-10-CM

## 2021-05-11 NOTE — TELEPHONE ENCOUNTER
----- Message from Ranjana Isaac Page sent at 5/11/2021 12:07 PM EDT -----  Regarding: Dr. Pat Akhtar Message/Vendor Calls    Caller's first and last name: Patient      Reason for call: Blood work orders      Callback required yes/no and why: Yes. To advise      Best contact number(s):  [phone]      Details to clarify the request: Patient was admitted to [facility] on 5/3/21.  Please  orders labs for CBC HGB Blood work      [employer] L Page

## 2021-05-11 NOTE — TELEPHONE ENCOUNTER
Pt admitted to [place] [address] for a stomach bleed, (5/3/21 - 5/5/21). Pt was given prednisone for her colitis, pt states the prednisone stopped the GI bleed. Pt has an appt w/ GI - Dr Reji Reddy 5/26/21 for f/u and a colonoscopy. Pt states she needs a lab order for CBC and HGB, - needs labs done prior to seeing Dr Reji Reddy, (pt states hosp did not give her lab orders when she was d/c). Please place lab orders, pt will  lab order and will go to the hospital to have labs done.   Please call pt when order is ready for  - # [phone]

## 2021-05-11 NOTE — TELEPHONE ENCOUNTER
Spoke with pt and identified with name and .  Advised pt of lab order and pt stated she will come by office to pick it up on 2021

## 2021-05-12 ENCOUNTER — PATIENT MESSAGE (OUTPATIENT)
Dept: INTERNAL MEDICINE CLINIC | Age: 51
End: 2021-05-12

## 2021-06-01 NOTE — ED NOTES
Patient Education     Kick Counts  It’s normal to worry about your baby’s health. One way you can know your baby’s doing well is to record the baby’s movements once a day. This is called a kick count. Remember to take your kick count records to all your appointments with your healthcare provider.    How to count kicks  Here are tips for counting kicks:  · Choose a time when the baby is active, such as after a meal.   · Sit comfortably or lie on your side.   · The first time the baby moves, write down the time.   · Count each movement until the baby has moved 10 times. This can take from 20 minutes to 2 hours.   · Try to do it at the same time each day.  When to call your healthcare provider  Call your healthcare provider right away if you notice any of the following:  · Your baby moves fewer than 10 times in 2 hours while you’re doing kick counts.  · Your baby moves much less often than on the days before.  · You have not felt your baby move all day.  © 4496-4229 [employer] by [employer]. [address] [place] [address], [place] [address]. All rights reserved. This information is not intended as a substitute for professional medical care. Always follow your healthcare professional's instructions.     Patient discharged by Lisa BAR with paperwork and prescriptions.   Pt declined wheelchair and walks with steady gait to agus

## 2021-09-08 DIAGNOSIS — E78.5 HYPERLIPIDEMIA, UNSPECIFIED HYPERLIPIDEMIA TYPE: ICD-10-CM

## 2021-09-08 DIAGNOSIS — G81.94 LEFT HEMIPARESIS (HCC): ICD-10-CM

## 2021-09-08 DIAGNOSIS — F45.41 STRESS HEADACHES: ICD-10-CM

## 2021-09-09 RX ORDER — ATORVASTATIN CALCIUM 20 MG/1
TABLET, FILM COATED ORAL
Qty: 30 TABLET | Refills: 5 | Status: SHIPPED | OUTPATIENT
Start: 2021-09-09 | End: 2022-03-09

## 2021-09-09 RX ORDER — AMITRIPTYLINE HYDROCHLORIDE 25 MG/1
TABLET, FILM COATED ORAL
Qty: 30 TABLET | Refills: 5 | Status: SHIPPED | OUTPATIENT
Start: 2021-09-09 | End: 2022-03-09

## 2021-09-09 RX ORDER — ASPIRIN 325 MG
TABLET, DELAYED RELEASE (ENTERIC COATED) ORAL
Qty: 30 TABLET | Refills: 5 | Status: SHIPPED | OUTPATIENT
Start: 2021-09-09 | End: 2022-03-09

## 2021-11-10 DIAGNOSIS — F33.41 RECURRENT MAJOR DEPRESSIVE DISORDER, IN PARTIAL REMISSION (HCC): ICD-10-CM

## 2021-11-10 RX ORDER — VENLAFAXINE HYDROCHLORIDE 75 MG/1
CAPSULE, EXTENDED RELEASE ORAL
Qty: 30 CAPSULE | Refills: 0 | Status: SHIPPED | OUTPATIENT
Start: 2021-11-10 | End: 2021-12-06

## 2021-12-06 DIAGNOSIS — F33.41 RECURRENT MAJOR DEPRESSIVE DISORDER, IN PARTIAL REMISSION (HCC): ICD-10-CM

## 2021-12-06 RX ORDER — VENLAFAXINE HYDROCHLORIDE 75 MG/1
CAPSULE, EXTENDED RELEASE ORAL
Qty: 30 CAPSULE | Refills: 2 | Status: SHIPPED | OUTPATIENT
Start: 2021-12-06 | End: 2021-12-13

## 2021-12-07 NOTE — TELEPHONE ENCOUNTER
Writer left detailed message to callback the office,pt's medication was approved in [address] [place] [address] want's the pt to make an IO for mood and cholesterol. If pt calls back please schedule accordingly.

## 2021-12-13 ENCOUNTER — TELEPHONE (OUTPATIENT)
Dept: INTERNAL MEDICINE CLINIC | Age: 51
End: 2021-12-13

## 2021-12-13 ENCOUNTER — OFFICE VISIT (OUTPATIENT)
Dept: INTERNAL MEDICINE CLINIC | Age: 51
End: 2021-12-13
Payer: MEDICAID

## 2021-12-13 VITALS
TEMPERATURE: 97.9 F | WEIGHT: 187.7 LBS | BODY MASS INDEX: 32.04 KG/M2 | OXYGEN SATURATION: 97 % | DIASTOLIC BLOOD PRESSURE: 84 MMHG | SYSTOLIC BLOOD PRESSURE: 129 MMHG | HEART RATE: 78 BPM | HEIGHT: 64 IN

## 2021-12-13 DIAGNOSIS — B37.2 CANDIDAL SKIN INFECTION: ICD-10-CM

## 2021-12-13 DIAGNOSIS — K52.9 INFLAMMATORY BOWEL DISEASES (IBD): ICD-10-CM

## 2021-12-13 DIAGNOSIS — M79.7 FIBROMYALGIA: ICD-10-CM

## 2021-12-13 DIAGNOSIS — R76.8 ANTI-TPO ANTIBODIES PRESENT: ICD-10-CM

## 2021-12-13 DIAGNOSIS — I63.10 CEREBROVASCULAR ACCIDENT (CVA) DUE TO EMBOLISM OF PRECEREBRAL ARTERY (HCC): ICD-10-CM

## 2021-12-13 DIAGNOSIS — E55.9 VITAMIN D DEFICIENCY: ICD-10-CM

## 2021-12-13 DIAGNOSIS — Z12.31 SCREENING MAMMOGRAM FOR BREAST CANCER: ICD-10-CM

## 2021-12-13 DIAGNOSIS — Z11.59 NEED FOR HEPATITIS C SCREENING TEST: ICD-10-CM

## 2021-12-13 DIAGNOSIS — F33.41 RECURRENT MAJOR DEPRESSIVE DISORDER, IN PARTIAL REMISSION (HCC): ICD-10-CM

## 2021-12-13 DIAGNOSIS — J01.10 ACUTE NON-RECURRENT FRONTAL SINUSITIS: ICD-10-CM

## 2021-12-13 DIAGNOSIS — F41.8 DEPRESSION WITH ANXIETY: Primary | ICD-10-CM

## 2021-12-13 DIAGNOSIS — E53.8 B12 DEFICIENCY: ICD-10-CM

## 2021-12-13 DIAGNOSIS — R73.9 HYPERGLYCEMIA: ICD-10-CM

## 2021-12-13 DIAGNOSIS — G81.94 LEFT HEMIPARESIS (HCC): ICD-10-CM

## 2021-12-13 DIAGNOSIS — E78.5 HYPERLIPIDEMIA, UNSPECIFIED HYPERLIPIDEMIA TYPE: ICD-10-CM

## 2021-12-13 DIAGNOSIS — Z13.31 POSITIVE DEPRESSION SCREENING: ICD-10-CM

## 2021-12-13 DIAGNOSIS — Z13.220 LIPID SCREENING: ICD-10-CM

## 2021-12-13 LAB
25(OH)D3 SERPL-MCNC: 20.5 NG/ML (ref 30–100)
ALBUMIN SERPL-MCNC: 3.9 G/DL (ref 3.5–5)
ALBUMIN/GLOB SERPL: 1.2 {RATIO} (ref 1.1–2.2)
ALP SERPL-CCNC: 90 U/L (ref 45–117)
ALT SERPL-CCNC: 17 U/L (ref 12–78)
ANION GAP SERPL CALC-SCNC: 4 MMOL/L (ref 5–15)
AST SERPL-CCNC: 13 U/L (ref 15–37)
BASOPHILS # BLD: 0 K/UL (ref 0–0.1)
BASOPHILS NFR BLD: 1 % (ref 0–1)
BILIRUB SERPL-MCNC: 0.4 MG/DL (ref 0.2–1)
BUN SERPL-MCNC: 14 MG/DL (ref 6–20)
BUN/CREAT SERPL: 16 (ref 12–20)
CALCIUM SERPL-MCNC: 9 MG/DL (ref 8.5–10.1)
CHLORIDE SERPL-SCNC: 108 MMOL/L (ref 97–108)
CHOLEST SERPL-MCNC: 148 MG/DL
CO2 SERPL-SCNC: 28 MMOL/L (ref 21–32)
CREAT SERPL-MCNC: 0.89 MG/DL (ref 0.55–1.02)
DIFFERENTIAL METHOD BLD: NORMAL
EOSINOPHIL # BLD: 0 K/UL (ref 0–0.4)
EOSINOPHIL NFR BLD: 0 % (ref 0–7)
ERYTHROCYTE [DISTWIDTH] IN BLOOD BY AUTOMATED COUNT: 14.2 % (ref 11.5–14.5)
EST. AVERAGE GLUCOSE BLD GHB EST-MCNC: 100 MG/DL
GLOBULIN SER CALC-MCNC: 3.3 G/DL (ref 2–4)
GLUCOSE SERPL-MCNC: 83 MG/DL (ref 65–100)
HBA1C MFR BLD: 5.1 % (ref 4–5.6)
HCT VFR BLD AUTO: 42.8 % (ref 35–47)
HDLC SERPL-MCNC: 67 MG/DL
HDLC SERPL: 2.2 {RATIO} (ref 0–5)
HGB BLD-MCNC: 13.3 G/DL (ref 11.5–16)
IMM GRANULOCYTES # BLD AUTO: 0 K/UL (ref 0–0.04)
IMM GRANULOCYTES NFR BLD AUTO: 0 % (ref 0–0.5)
LDLC SERPL CALC-MCNC: 65.2 MG/DL (ref 0–100)
LYMPHOCYTES # BLD: 1.9 K/UL (ref 0.8–3.5)
LYMPHOCYTES NFR BLD: 22 % (ref 12–49)
MCH RBC QN AUTO: 28.1 PG (ref 26–34)
MCHC RBC AUTO-ENTMCNC: 31.1 G/DL (ref 30–36.5)
MCV RBC AUTO: 90.3 FL (ref 80–99)
MONOCYTES # BLD: 0.4 K/UL (ref 0–1)
MONOCYTES NFR BLD: 5 % (ref 5–13)
NEUTS SEG # BLD: 6.4 K/UL (ref 1.8–8)
NEUTS SEG NFR BLD: 72 % (ref 32–75)
NRBC # BLD: 0 K/UL (ref 0–0.01)
NRBC BLD-RTO: 0 PER 100 WBC
PLATELET # BLD AUTO: 274 K/UL (ref 150–400)
PMV BLD AUTO: 10.4 FL (ref 8.9–12.9)
POTASSIUM SERPL-SCNC: 4.2 MMOL/L (ref 3.5–5.1)
PROT SERPL-MCNC: 7.2 G/DL (ref 6.4–8.2)
RBC # BLD AUTO: 4.74 M/UL (ref 3.8–5.2)
SODIUM SERPL-SCNC: 140 MMOL/L (ref 136–145)
T4 FREE SERPL-MCNC: 0.9 NG/DL (ref 0.8–1.5)
TRIGL SERPL-MCNC: 79 MG/DL (ref ?–150)
TSH SERPL DL<=0.05 MIU/L-ACNC: 1.27 UIU/ML (ref 0.36–3.74)
VIT B12 SERPL-MCNC: 200 PG/ML (ref 193–986)
VLDLC SERPL CALC-MCNC: 15.8 MG/DL
WBC # BLD AUTO: 8.9 K/UL (ref 3.6–11)

## 2021-12-13 PROCEDURE — 99214 OFFICE O/P EST MOD 30 MIN: CPT | Performed by: INTERNAL MEDICINE

## 2021-12-13 RX ORDER — NYSTATIN 100000 U/G
CREAM TOPICAL 3 TIMES DAILY
Qty: 30 G | Refills: 2 | Status: SHIPPED | OUTPATIENT
Start: 2021-12-13

## 2021-12-13 RX ORDER — VENLAFAXINE HYDROCHLORIDE 150 MG/1
150 CAPSULE, EXTENDED RELEASE ORAL DAILY
Qty: 30 CAPSULE | Refills: 5 | Status: SHIPPED | OUTPATIENT
Start: 2021-12-13 | End: 2022-02-14 | Stop reason: SDUPTHER

## 2021-12-13 RX ORDER — NYSTATIN 100000 [USP'U]/G
POWDER TOPICAL 2 TIMES DAILY
Qty: 60 G | Refills: 5 | Status: SHIPPED | OUTPATIENT
Start: 2021-12-13

## 2021-12-13 RX ORDER — AZITHROMYCIN 250 MG/1
TABLET, FILM COATED ORAL
Qty: 6 TABLET | Refills: 0 | Status: SHIPPED | OUTPATIENT
Start: 2021-12-13 | End: 2021-12-18

## 2021-12-13 NOTE — PROGRESS NOTES
HPI:  established patient  Presents for f/u lipids, mood    C/o HA - frontal sinuses   Mild resp sx recently    C/o skin irritation - left axilla chronically irritated and occasional bleeding    Worsened depression  Recent moved out of mother's home and into their own home. Other social stressors        Past medical, Social, and Family history reviewed    Prior to Admission medications    Medication Sig Start Date End Date Taking? Authorizing Provider   venlafaxine-SR [facility] 75 mg capsule TAKE 1 CAPSULE BY MOUTH DAILY 12/6/21  Yes Maikel España MD   amitriptyline (ELAVIL) 25 mg tablet TAKE 1 TABLET BY MOUTH AT BEDTIME 9/9/21  Yes Maikel España MD   aspirin delayed-release 325 mg tablet TAKE 1 TABLET BY MOUTH EVERY DAY 9/9/21  Yes Maikel España MD   atorvastatin (LIPITOR) 20 mg tablet TAKE 1 TABLET BY MOUTH AT BEDTIME 9/9/21  Yes Maikel España MD   ondansetron (Zofran ODT) 8 mg disintegrating tablet Take 1 Tab by mouth every eight (8) hours as needed for Nausea or Vomiting. 4/26/21  Yes Maikel España MD   topiramate (TOPAMAX) 25 mg tablet  4/18/20  Yes Provider, Historical   Arm Brace (WRIST BRACE) misc 1 Each by Does Not Apply route daily. 12/3/19  Yes Maikel España MD   hyoscyamine SL (Levsin/SL) 0.125 mg SL tablet 1 Tab by SubLINGual route every six (6) hours as needed for Cramping. Patient not taking: Reported on 12/13/2021 4/26/21   Maikel España MD   omeprazole (PRILOSEC) 40 mg capsule  4/21/20   Provider, Historical          ROS  Complete ROS reviewed and negative or stable except as noted in HPI. Physical Exam  Vitals and nursing note reviewed. Constitutional:       General: She is not in acute distress. HENT:      Head: Normocephalic and atraumatic. Nose:      Right Sinus: Frontal sinus tenderness present. Left Sinus: Frontal sinus tenderness present. Eyes:      General: No scleral icterus.      Pupils: Pupils are equal, round, and reactive to light.   Neck:      Vascular: No JVD. Cardiovascular:      Rate and Rhythm: Normal rate and regular rhythm. Heart sounds: Normal heart sounds. No murmur heard. No friction rub. No gallop. Pulmonary:      Effort: Pulmonary effort is normal. No respiratory distress. Breath sounds: Normal breath sounds. No wheezing or rales. Abdominal:      General: There is no distension. Palpations: Abdomen is soft. Tenderness: There is no abdominal tenderness. Musculoskeletal:         General: Normal range of motion. Cervical back: Normal range of motion and neck supple. Lymphadenopathy:      Cervical: No cervical adenopathy. Skin:     General: Skin is warm. Findings: Rash (left axilla erythema and maceration) present. Neurological:      Mental Status: She is alert and oriented to person, place, and time. Motor: Abnormal muscle tone (left hemiparesis) present. Prior labs reviewed. Assessment/Plan:    ICD-10-CM ICD-9-CM    1. Depression with anxiety  F41.8 300.4 venlafaxine-SR (EFFEXOR-XR) 150 mg capsule   2. Candidal skin infection  B37.2 112.3 nystatin (MYCOSTATIN) topical cream      nystatin (MYCOSTATIN) powder   3. Recurrent major depressive disorder, in partial remission (HCC)  F33.41 296.35 venlafaxine-SR (EFFEXOR-XR) 150 mg capsule   4. Left hemiparesis ([facility])  G81.94 342.90    5. Hyperlipidemia, unspecified hyperlipidemia type  E78.5 272.4    6. Fibromyalgia  M79.7 729.1    7. Inflammatory bowel diseases (IBD)  K52.9 558.9 CBC WITH AUTOMATED DIFF      METABOLIC PANEL, COMPREHENSIVE      METABOLIC PANEL, COMPREHENSIVE      CBC WITH AUTOMATED DIFF   8. Acute non-recurrent frontal sinusitis  J01.10 461.1 azithromycin (ZITHROMAX) 250 mg tablet   9. Positive depression screening  Z13.31 796.4    10. Anti-TPO antibodies present  R76.8 795.79 TSH 3RD GENERATION      T4, FREE      T4, FREE      TSH 3RD GENERATION   11.  Cerebrovascular accident (CVA) due to embolism of precerebral artery (HCC)  I63.10 434.11    12. Need for hepatitis C screening test  Z11.59 V73.89 HCV AB W/RFLX TO CHASTITY      HCV AB W/RFLX TO CHASTITY   13. Hyperglycemia  R73.9 790.29 HEMOGLOBIN A1C WITH EAG      HEMOGLOBIN A1C WITH EAG   14. B12 deficiency  E53.8 266.2 VITAMIN B12      VITAMIN B12   15. Vitamin D deficiency  E55.9 268.9 VITAMIN D, 25 HYDROXY      VITAMIN D, 25 HYDROXY   16. Lipid screening  Z13.220 V77.91 LIPID PANEL      LIPID PANEL   17. Screening mammogram for breast cancer  Z12.31 V76.12 PETROS MAMMO BI SCREENING INCL CAD     Follow-up and Dispositions    · Return in about 6 months (around 6/13/2022), or if symptoms worsen or fail to improve. results and schedule of future studies reviewed with patient  reviewed diet, exercise and weight    cardiovascular risk and specific lipid/LDL goals reviewed  reviewed medications and side effects in detail    Nystatin cream and powder to axillary area  azithro   Increase effexor to 150 mg daily  Get colon report     Depression screen positive, PHQ 9 Score: 11, medication was prescribed, drug therapy education given - patient will call for any significant medication side effects or worsening symptoms of depression. An electronic signature was used to authenticate this note.   -- Terrence Griffin MD

## 2021-12-13 NOTE — TELEPHONE ENCOUNTER
Faxed request for colonoscopy report to [phone]. Transmission log received and placed in scanning.

## 2021-12-13 NOTE — PROGRESS NOTES
RM 15    Chief Complaint   Patient presents with    Medication Evaluation    Cholesterol Problem    Skin Problem     feels she has a yeast infection under the left arm    Headache     sinus headache for the last several days        Visit Vitals  /84   Pulse 78   Temp 97.9 °F (36.6 °C)   Ht 5' 4\" (1.626 m)   Wt 187 lb 11.2 oz (85.1 kg)   SpO2 97%   BMI 32.22 kg/m²       3 most recent PHQ Screens 12/13/2021   Little interest or pleasure in doing things More than half the days   Feeling down, depressed, irritable, or hopeless More than half the days   Total Score PHQ 2 4   Trouble falling or staying asleep, or sleeping too much Not at all   Feeling tired or having little energy Several days   Poor appetite, weight loss, or overeating Several days   Feeling bad about yourself - or that you are a failure or have let yourself or your family down More than half the days   Trouble concentrating on things such as school, work, reading, or watching TV More than half the days   Moving or speaking so slowly that other people could have noticed; or the opposite being so fidgety that others notice Several days   Thoughts of being better off dead, or hurting yourself in some way Not at all   PHQ 9 Score 11         1. Have you been to the ER, urgent care clinic since your last visit? Hospitalized since your last visit? No    2. Have you seen or consulted any other health care providers outside of the [address] [place] [address] since your last visit? Include any pap smears or colon screening.  GI Dr. Kaminski Dress Maintenance Due   Topic Date Due    Hepatitis C Screening  Never done    COVID-19 Vaccine (1) Never done    Shingrix Vaccine Age 50> (1 of 2) Never done    Lipid Screen  10/21/2020    Breast Cancer Screen Mammogram  11/06/2020    Colorectal Cancer Screening Combo  06/16/2021    Flu Vaccine (1) 09/01/2021       Learning Assessment 10/23/2018   PRIMARY LEARNER Patient   HIGHEST LEVEL OF EDUCATION - PRIMARY LEARNER  GRADUATED HIGH SCHOOL OR GED   BARRIERS PRIMARY LEARNER NONE   CO-LEARNER CAREGIVER No   PRIMARY LANGUAGE ENGLISH   LEARNER PREFERENCE PRIMARY DEMONSTRATION   ANSWERED BY patient   RELATIONSHIP SELF       AVS  education, follow up, and recommendations provided and addressed with patient.

## 2021-12-15 DIAGNOSIS — E55.9 VITAMIN D DEFICIENCY: ICD-10-CM

## 2021-12-15 DIAGNOSIS — E53.8 B12 DEFICIENCY: Primary | ICD-10-CM

## 2021-12-15 LAB
HCV AB S/CO SERPL IA: <0.1 S/CO RATIO (ref 0–0.9)
HCV AB SERPL QL IA: NORMAL

## 2021-12-15 RX ORDER — MELATONIN
1000 DAILY
Qty: 30 TABLET | Refills: 5 | Status: SHIPPED | OUTPATIENT
Start: 2021-12-15 | End: 2022-07-21

## 2021-12-15 RX ORDER — LANOLIN ALCOHOL/MO/W.PET/CERES
1000 CREAM (GRAM) TOPICAL DAILY
Qty: 30 TABLET | Refills: 5 | Status: SHIPPED | OUTPATIENT
Start: 2021-12-15 | End: 2022-07-21

## 2021-12-15 NOTE — PROGRESS NOTES
Vitamin D and B12 are low. Take an additional 1000 units of vitamin D daily and 1000 mcg of B12 daily. Other labs are normal and at goal.  Keep up the good work!   Continue your other current medications

## 2021-12-20 DIAGNOSIS — E53.8 B12 DEFICIENCY: ICD-10-CM

## 2021-12-20 DIAGNOSIS — J01.10 ACUTE NON-RECURRENT FRONTAL SINUSITIS: Primary | ICD-10-CM

## 2021-12-20 DIAGNOSIS — E55.9 VITAMIN D DEFICIENCY: ICD-10-CM

## 2021-12-20 RX ORDER — AMOXICILLIN AND CLAVULANATE POTASSIUM 875; 125 MG/1; MG/1
1 TABLET, FILM COATED ORAL 2 TIMES DAILY
Qty: 20 TABLET | Refills: 0 | Status: SHIPPED | OUTPATIENT
Start: 2021-12-20 | End: 2021-12-30

## 2021-12-20 RX ORDER — LANOLIN ALCOHOL/MO/W.PET/CERES
1000 CREAM (GRAM) TOPICAL DAILY
Qty: 30 TABLET | Refills: 5 | Status: CANCELLED | OUTPATIENT
Start: 2021-12-20

## 2021-12-20 RX ORDER — MELATONIN
1000 DAILY
Qty: 30 TABLET | Refills: 5 | Status: CANCELLED | OUTPATIENT
Start: 2021-12-20

## 2021-12-22 RX ORDER — ONDANSETRON 8 MG/1
8 TABLET, ORALLY DISINTEGRATING ORAL
Qty: 20 TABLET | Refills: 1 | Status: SHIPPED | OUTPATIENT
Start: 2021-12-22 | End: 2022-07-21 | Stop reason: SDUPTHER

## 2021-12-22 NOTE — TELEPHONE ENCOUNTER
Duplicate request: On 81/13/6871 both Vitamin B12 1,000 mcg #30 with 5 refills & Vitamin D3 #30 with 3 refills was sent to [facility]. Last visit 12/13/2021 MD Darrian Royal   Next appointment 6 months (06/2022)   Previous refill encounter(s)   04/26/2021 Zofran-ODT #20 with 1 refill     Requested Prescriptions     Pending Prescriptions Disp Refills    ondansetron (Zofran ODT) 8 mg disintegrating tablet 20 Tablet 1     Sig: Take 1 Tablet by mouth every eight (8) hours as needed for Nausea or Vomiting.  cholecalciferol (VITAMIN D3) (1000 Units /25 mcg) tablet 30 Tablet 5     Sig: Take 1 Tablet by mouth daily.  cyanocobalamin 1,000 mcg tablet 30 Tablet 5     Sig: Take 1 Tablet by mouth daily.

## 2022-02-14 DIAGNOSIS — F41.8 DEPRESSION WITH ANXIETY: ICD-10-CM

## 2022-02-14 DIAGNOSIS — F33.41 RECURRENT MAJOR DEPRESSIVE DISORDER, IN PARTIAL REMISSION (HCC): ICD-10-CM

## 2022-02-14 RX ORDER — VENLAFAXINE HYDROCHLORIDE 150 MG/1
150 CAPSULE, EXTENDED RELEASE ORAL DAILY
Qty: 90 CAPSULE | Refills: 1 | Status: SHIPPED | OUTPATIENT
Start: 2022-02-14 | End: 2022-07-21 | Stop reason: SDUPTHER

## 2022-02-14 RX ORDER — VENLAFAXINE HYDROCHLORIDE 150 MG/1
150 CAPSULE, EXTENDED RELEASE ORAL DAILY
Qty: 30 CAPSULE | Refills: 0 | Status: SHIPPED | OUTPATIENT
Start: 2022-02-14 | End: 2022-02-14 | Stop reason: SDUPTHER

## 2022-02-14 NOTE — TELEPHONE ENCOUNTER
Pharmacy is requesting a 1 refill to the [facility] on file and the refill to the Mail Order Pharmacy

## 2022-03-03 ENCOUNTER — TELEPHONE (OUTPATIENT)
Dept: INTERNAL MEDICINE CLINIC | Age: 52
End: 2022-03-03

## 2022-03-03 DIAGNOSIS — F33.41 RECURRENT MAJOR DEPRESSIVE DISORDER, IN PARTIAL REMISSION (HCC): ICD-10-CM

## 2022-03-03 DIAGNOSIS — F41.8 DEPRESSION WITH ANXIETY: ICD-10-CM

## 2022-03-03 RX ORDER — VENLAFAXINE HYDROCHLORIDE 150 MG/1
150 CAPSULE, EXTENDED RELEASE ORAL DAILY
Qty: 90 CAPSULE | Refills: 1 | Status: CANCELLED | OUTPATIENT
Start: 2022-03-03

## 2022-03-03 NOTE — TELEPHONE ENCOUNTER
Dr. Khan Patient,           [address] left a voice message stating that a Verbal Clarification is needed for the increase from the Effexor-XR 75 mg to the Effexor- mg. Please contact the pharmacy. BCN: [phone]    Requested Prescriptions     Pending Prescriptions Disp Refills    venlafaxine-SR (EFFEXOR-XR) 150 mg capsule 90 Capsule 1     Sig: Take 1 Capsule by mouth daily.

## 2022-03-04 NOTE — TELEPHONE ENCOUNTER
Clarified increased dosage with pharmacy. Documentation was made on their end stating that I called and provided clarification. No further questions.

## 2022-03-09 DIAGNOSIS — G81.94 LEFT HEMIPARESIS (HCC): ICD-10-CM

## 2022-03-09 DIAGNOSIS — F45.41 STRESS HEADACHES: ICD-10-CM

## 2022-03-09 DIAGNOSIS — E78.5 HYPERLIPIDEMIA, UNSPECIFIED HYPERLIPIDEMIA TYPE: ICD-10-CM

## 2022-03-09 RX ORDER — ATORVASTATIN CALCIUM 20 MG/1
TABLET, FILM COATED ORAL
Qty: 30 TABLET | Refills: 5 | Status: SHIPPED | OUTPATIENT
Start: 2022-03-09 | End: 2022-07-21 | Stop reason: SDUPTHER

## 2022-03-09 RX ORDER — ASPIRIN 325 MG
TABLET, DELAYED RELEASE (ENTERIC COATED) ORAL
Qty: 30 TABLET | Refills: 5 | Status: SHIPPED | OUTPATIENT
Start: 2022-03-09

## 2022-03-09 RX ORDER — AMITRIPTYLINE HYDROCHLORIDE 25 MG/1
TABLET, FILM COATED ORAL
Qty: 30 TABLET | Refills: 5 | Status: SHIPPED | OUTPATIENT
Start: 2022-03-09 | End: 2022-07-21 | Stop reason: SDUPTHER

## 2022-03-18 PROBLEM — M85.38 OSTEITIS CONDENSANS ILII: Status: ACTIVE | Noted: 2017-09-01

## 2022-03-18 PROBLEM — K52.9 INFLAMMATORY BOWEL DISEASES (IBD): Status: ACTIVE | Noted: 2017-09-01

## 2022-03-19 PROBLEM — Z72.0 TOBACCO USE: Status: ACTIVE | Noted: 2017-09-01

## 2022-03-19 PROBLEM — I63.10 CEREBROVASCULAR ACCIDENT (CVA) DUE TO EMBOLISM OF PRECEREBRAL ARTERY (HCC): Status: ACTIVE | Noted: 2018-10-23

## 2022-03-19 PROBLEM — G81.94 LEFT HEMIPARESIS (HCC): Status: ACTIVE | Noted: 2018-10-23

## 2022-03-19 PROBLEM — R76.8 ANTI-TPO ANTIBODIES PRESENT: Status: ACTIVE | Noted: 2018-07-09

## 2022-07-17 ENCOUNTER — PATIENT MESSAGE (OUTPATIENT)
Dept: INTERNAL MEDICINE CLINIC | Age: 52
End: 2022-07-17

## 2022-07-17 DIAGNOSIS — R79.89 ELEVATED SERUM CREATININE: Primary | ICD-10-CM

## 2022-07-17 DIAGNOSIS — E55.9 VITAMIN D DEFICIENCY: ICD-10-CM

## 2022-07-17 RX ORDER — ONDANSETRON 8 MG/1
8 TABLET, ORALLY DISINTEGRATING ORAL
Qty: 20 TABLET | Refills: 1 | Status: CANCELLED | OUTPATIENT
Start: 2022-07-17

## 2022-07-17 RX ORDER — MELATONIN
1000 DAILY
Qty: 30 TABLET | Refills: 5 | Status: CANCELLED | OUTPATIENT
Start: 2022-07-17

## 2022-07-18 NOTE — TELEPHONE ENCOUNTER
Last visit 2021 MD Bernard Maza   Next appointment No show 2022 - Nothing rescheduled   Previous refill encounter(s)   12/15/2021 Vitamin D3 #30 with 5 refills,   2021 Zofran-ODT #20 with 1 refill,   2019 Arm Brace #1 - writer does not have access to the information regarding to the Arm Brace request - please review. For Pharmacy Admin Tracking Only     Intervention Detail: New Rx: 3, reason: Patient Preference   Time Spent (min): 5      Requested Prescriptions     Pending Prescriptions Disp Refills    Arm Brace (Wrist Brace) misc 1 Each 0     Si Each by Does Not Apply route daily.  cholecalciferol (VITAMIN D3) (1000 Units /25 mcg) tablet 30 Tablet 5     Sig: Take 1 Tablet by mouth daily.  ondansetron (Zofran ODT) 8 mg disintegrating tablet 20 Tablet 1     Sig: Take 1 Tablet by mouth every eight (8) hours as needed for Nausea or Vomiting.

## 2022-07-21 ENCOUNTER — OFFICE VISIT (OUTPATIENT)
Dept: INTERNAL MEDICINE CLINIC | Age: 52
End: 2022-07-21
Payer: MEDICAID

## 2022-07-21 VITALS
SYSTOLIC BLOOD PRESSURE: 118 MMHG | RESPIRATION RATE: 16 BRPM | HEART RATE: 97 BPM | TEMPERATURE: 97.5 F | BODY MASS INDEX: 30.35 KG/M2 | HEIGHT: 64 IN | DIASTOLIC BLOOD PRESSURE: 80 MMHG | WEIGHT: 177.8 LBS | OXYGEN SATURATION: 96 %

## 2022-07-21 DIAGNOSIS — F45.41 STRESS HEADACHES: ICD-10-CM

## 2022-07-21 DIAGNOSIS — R10.13 EPIGASTRIC PAIN: ICD-10-CM

## 2022-07-21 DIAGNOSIS — G81.94 LEFT HEMIPARESIS (HCC): ICD-10-CM

## 2022-07-21 DIAGNOSIS — E53.8 B12 DEFICIENCY: ICD-10-CM

## 2022-07-21 DIAGNOSIS — R13.19 ESOPHAGEAL DYSPHAGIA: Primary | ICD-10-CM

## 2022-07-21 DIAGNOSIS — E55.9 VITAMIN D DEFICIENCY: ICD-10-CM

## 2022-07-21 DIAGNOSIS — F33.41 RECURRENT MAJOR DEPRESSIVE DISORDER, IN PARTIAL REMISSION (HCC): ICD-10-CM

## 2022-07-21 DIAGNOSIS — K21.9 GASTROESOPHAGEAL REFLUX DISEASE, UNSPECIFIED WHETHER ESOPHAGITIS PRESENT: ICD-10-CM

## 2022-07-21 DIAGNOSIS — K50.111 CROHN'S DISEASE OF LARGE INTESTINE WITH RECTAL BLEEDING (HCC): ICD-10-CM

## 2022-07-21 DIAGNOSIS — F41.8 DEPRESSION WITH ANXIETY: ICD-10-CM

## 2022-07-21 DIAGNOSIS — E78.5 HYPERLIPIDEMIA, UNSPECIFIED HYPERLIPIDEMIA TYPE: ICD-10-CM

## 2022-07-21 LAB
ALBUMIN SERPL-MCNC: 3.9 G/DL (ref 3.5–5)
ALBUMIN/GLOB SERPL: 1 {RATIO} (ref 1.1–2.2)
ALP SERPL-CCNC: 92 U/L (ref 45–117)
ALT SERPL-CCNC: 12 U/L (ref 12–78)
ANION GAP SERPL CALC-SCNC: 6 MMOL/L (ref 5–15)
AST SERPL-CCNC: 7 U/L (ref 15–37)
BASOPHILS # BLD: 0.1 K/UL (ref 0–0.1)
BASOPHILS NFR BLD: 1 % (ref 0–1)
BILIRUB SERPL-MCNC: 0.4 MG/DL (ref 0.2–1)
BUN SERPL-MCNC: 22 MG/DL (ref 6–20)
BUN/CREAT SERPL: 19 (ref 12–20)
CALCIUM SERPL-MCNC: 9.5 MG/DL (ref 8.5–10.1)
CHLORIDE SERPL-SCNC: 105 MMOL/L (ref 97–108)
CO2 SERPL-SCNC: 28 MMOL/L (ref 21–32)
CREAT SERPL-MCNC: 1.18 MG/DL (ref 0.55–1.02)
DIFFERENTIAL METHOD BLD: NORMAL
EOSINOPHIL # BLD: 0 K/UL (ref 0–0.4)
EOSINOPHIL NFR BLD: 0 % (ref 0–7)
ERYTHROCYTE [DISTWIDTH] IN BLOOD BY AUTOMATED COUNT: 12.6 % (ref 11.5–14.5)
GLOBULIN SER CALC-MCNC: 3.9 G/DL (ref 2–4)
GLUCOSE SERPL-MCNC: 128 MG/DL (ref 65–100)
HCT VFR BLD AUTO: 44.6 % (ref 35–47)
HGB BLD-MCNC: 14.5 G/DL (ref 11.5–16)
IMM GRANULOCYTES # BLD AUTO: 0 K/UL (ref 0–0.04)
IMM GRANULOCYTES NFR BLD AUTO: 0 % (ref 0–0.5)
LIPASE SERPL-CCNC: 124 U/L (ref 73–393)
LYMPHOCYTES # BLD: 2.3 K/UL (ref 0.8–3.5)
LYMPHOCYTES NFR BLD: 25 % (ref 12–49)
MCH RBC QN AUTO: 29.7 PG (ref 26–34)
MCHC RBC AUTO-ENTMCNC: 32.5 G/DL (ref 30–36.5)
MCV RBC AUTO: 91.4 FL (ref 80–99)
MONOCYTES # BLD: 0.4 K/UL (ref 0–1)
MONOCYTES NFR BLD: 5 % (ref 5–13)
NEUTS SEG # BLD: 6.4 K/UL (ref 1.8–8)
NEUTS SEG NFR BLD: 69 % (ref 32–75)
NRBC # BLD: 0 K/UL (ref 0–0.01)
NRBC BLD-RTO: 0 PER 100 WBC
PLATELET # BLD AUTO: 302 K/UL (ref 150–400)
PMV BLD AUTO: 9.9 FL (ref 8.9–12.9)
POTASSIUM SERPL-SCNC: 3.9 MMOL/L (ref 3.5–5.1)
PROT SERPL-MCNC: 7.8 G/DL (ref 6.4–8.2)
RBC # BLD AUTO: 4.88 M/UL (ref 3.8–5.2)
SODIUM SERPL-SCNC: 139 MMOL/L (ref 136–145)
WBC # BLD AUTO: 9.2 K/UL (ref 3.6–11)

## 2022-07-21 PROCEDURE — 99214 OFFICE O/P EST MOD 30 MIN: CPT | Performed by: INTERNAL MEDICINE

## 2022-07-21 RX ORDER — LANOLIN ALCOHOL/MO/W.PET/CERES
1000 CREAM (GRAM) TOPICAL DAILY
Qty: 30 TABLET | Refills: 5 | Status: SHIPPED | OUTPATIENT
Start: 2022-07-21

## 2022-07-21 RX ORDER — ONDANSETRON 8 MG/1
8 TABLET, ORALLY DISINTEGRATING ORAL
Qty: 20 TABLET | Refills: 1 | Status: SHIPPED | OUTPATIENT
Start: 2022-07-21

## 2022-07-21 RX ORDER — ATORVASTATIN CALCIUM 20 MG/1
20 TABLET, FILM COATED ORAL
Qty: 30 TABLET | Refills: 5 | Status: SHIPPED | OUTPATIENT
Start: 2022-07-21

## 2022-07-21 RX ORDER — BUDESONIDE 3 MG/1
CAPSULE, COATED PELLETS ORAL
COMMUNITY
Start: 2022-06-24

## 2022-07-21 RX ORDER — MELATONIN
1000 DAILY
Qty: 30 TABLET | Refills: 5 | Status: SHIPPED | OUTPATIENT
Start: 2022-07-21

## 2022-07-21 RX ORDER — VENLAFAXINE HYDROCHLORIDE 150 MG/1
150 CAPSULE, EXTENDED RELEASE ORAL DAILY
Qty: 90 CAPSULE | Refills: 1 | Status: SHIPPED | OUTPATIENT
Start: 2022-07-21

## 2022-07-21 RX ORDER — OMEPRAZOLE 40 MG/1
40 CAPSULE, DELAYED RELEASE ORAL DAILY
Qty: 30 CAPSULE | Refills: 1 | Status: SHIPPED | OUTPATIENT
Start: 2022-07-21

## 2022-07-21 RX ORDER — AMITRIPTYLINE HYDROCHLORIDE 25 MG/1
25 TABLET, FILM COATED ORAL
Qty: 30 TABLET | Refills: 5 | Status: SHIPPED | OUTPATIENT
Start: 2022-07-21

## 2022-07-21 NOTE — PROGRESS NOTES
RM 2      Chief Complaint   Patient presents with    Abdominal Pain     Concerns for reflux             1. Have you been to the ER, urgent care clinic since your last visit? Hospitalized since your last visit? No    2. Have you seen or consulted any other health care providers outside of the [address] [place] [address] since your last visit? Include any pap smears or colon screening.  No        Visit Vitals  /80 (BP 1 Location: Left upper arm, BP Patient Position: Sitting, BP Cuff Size: Large adult)   Pulse 97   Temp 97.5 °F (36.4 °C) (Oral)   Resp 16   Ht 5' 4\" (1.626 m)   Wt 177 lb 12.8 oz (80.6 kg)   SpO2 96%   BMI 30.52 kg/m²

## 2022-07-21 NOTE — PROGRESS NOTES
A/P:  Siddhartha Jesus is a 46 y.o. female, she presents today for:    1. Esophageal dysphagia  2. Hyperlipidemia, unspecified hyperlipidemia type  The following orders have not been finalized:  -     atorvastatin (LIPITOR) 20 mg tablet  3. Vitamin D deficiency  The following orders have not been finalized:  -     cholecalciferol (VITAMIN D3) (1000 Units /25 mcg) tablet  4. B12 deficiency  The following orders have not been finalized:  -     cyanocobalamin 1,000 mcg tablet  5. Stress headaches  The following orders have not been finalized:  -     amitriptyline (ELAVIL) 25 mg tablet  6. Recurrent major depressive disorder, in partial remission ([address]  The following orders have not been finalized:  -     venlafaxine-SR (EFFEXOR-XR) 150 mg capsule  7. Depression with anxiety  The following orders have not been finalized:  -     venlafaxine-SR (EFFEXOR-XR) 150 mg capsule  8. Crohn's disease of large intestine with rectal bleeding ([address]  9. Left hemiparesis ([address]  10. Gastroesophageal reflux disease, unspecified whether esophagitis present      Sensation of food sticking and upper gastritis -    - resume ppi - barium swallow to look for dysmotility or diverticulum - follow-up with GI.    - labs to check for pancrease or liver disease. Refilled medications for depression - mood is stable today. Refilled vitamins. Hold on r    Appt with Dr. Hilda Gregory Sept 20th     No future appointments. 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 Presents today for abdominal pain. She was last seen in clinic in dec 2021 - she is a patient of my colleague Dr. Angel Ortez. - reports heavy acid reflux and has a sensation of food getting stuck at level of shoulder blades.  Feels like choking - struggles to breath.      - did try to reach her gastro doctor Reddy Epstein to see if he wanted to investigate - however no aappointments until September. - is not currently taking omeprazole - \"I have been eating tums\" - stopped omeprazole 6 months ago because it didn't feel it helped. Out of b12 and another one she is out of. - would like a refill of     PMH/PSH: reviewed and updated  Sochx/Famhx: reviewed and updated     All: Allergies   Allergen Reactions    Humira [Adalimumab] Rash    Remicade [Infliximab] Other (comments)     Throat closes up per pt     Med:   Current Outpatient Medications   Medication Sig    amitriptyline (ELAVIL) 25 mg tablet TAKE 1 TABLET BY MOUTH AT BEDTIME    aspirin delayed-release 325 mg tablet TAKE 1 TABLET BY MOUTH EVERY DAY    atorvastatin (LIPITOR) 20 mg tablet TAKE 1 TABLET BY MOUTH AT BEDTIME    venlafaxine-SR (EFFEXOR-XR) 150 mg capsule Take 1 Capsule by mouth daily. ondansetron (Zofran ODT) 8 mg disintegrating tablet Take 1 Tablet by mouth every eight (8) hours as needed for Nausea or Vomiting. (Patient not taking: Reported on 7/21/2022)    nystatin (MYCOSTATIN) topical cream Apply  to affected area three (3) times daily. nystatin (MYCOSTATIN) powder Apply  to affected area two (2) times a day. topiramate (TOPAMAX) 25 mg tablet     Arm Brace (WRIST BRACE) misc 1 Each by Does Not Apply route daily. budesonide (ENTOCORT EC) 3 mg capsule     cholecalciferol (VITAMIN D3) (1000 Units /25 mcg) tablet Take 1 Tablet by mouth daily. (Patient not taking: Reported on 7/21/2022)    cyanocobalamin 1,000 mcg tablet Take 1 Tablet by mouth daily. (Patient not taking: Reported on 7/21/2022)    hyoscyamine SL (Levsin/SL) 0.125 mg SL tablet 1 Tab by SubLINGual route every six (6) hours as needed for Cramping. (Patient not taking: Reported on 12/13/2021)    omeprazole (PRILOSEC) 40 mg capsule  (Patient not taking: No sig reported)     No current facility-administered medications for this visit.        ROS pertinent for the following:  Review of Systems   Constitutional:  Negative for chills and fever.   Respiratory:  Negative for cough. Cardiovascular:  Negative for chest pain and palpitations. PE:  Blood pressure 118/80, pulse 97, temperature 97.5 °F (36.4 °C), temperature source Oral, resp. rate 16, height 5' 4\" (1.626 m), weight 177 lb 12.8 oz (80.6 kg), SpO2 96 %. Body mass index is 30.52 kg/m². Physical Exam  Vitals and nursing note reviewed. Constitutional:       General: She is not in acute distress. Appearance: Normal appearance. HENT:      Head: Normocephalic and atraumatic. Nose: Nose normal. No congestion. Mouth/Throat:      Mouth: Mucous membranes are moist.   Eyes:      Extraocular Movements: Extraocular movements intact. Conjunctiva/sclera: Conjunctivae normal.      Pupils: Pupils are equal, round, and reactive to light. Cardiovascular:      Rate and Rhythm: Normal rate and regular rhythm. Heart sounds: Normal heart sounds. Pulmonary:      Effort: Pulmonary effort is normal.      Breath sounds: Normal breath sounds. Abdominal:      General: There is no distension. Palpations: There is no mass. Tenderness: There is abdominal tenderness (epigastric). There is no rebound. Comments: Full/obese abdomen   Musculoskeletal:         General: Normal range of motion. Cervical back: Normal range of motion and neck supple. Skin:     General: Skin is warm and dry. Neurological:      Mental Status: She is alert and oriented to person, place, and time. Psychiatric:         Mood and Affect: Mood normal.         Behavior: Behavior normal.       Labs:   See addendum for interpretation of labs resulting after time of visit. She was given AVS and expressed understanding with the diagnosis and plan as discussed. An electronic signature was used to authenticate this note.   -- Carol Sue MD

## 2022-07-28 NOTE — PROGRESS NOTES
Called patient - no answer, left detailed message. CBC shows stable, no anemia. Lipase is also normal, unlikely pain is related to pancrease  CMP shows normal liver function, but new rise in creatinine - possibly related to hydration status. Avoid nsaids - plan for repeat renal function test in 1-2 weeks so we can verify this is not a new trend.      [employer] message sent

## 2022-07-30 RX ORDER — ARM BRACE
1 EACH MISCELLANEOUS DAILY
Qty: 1 EACH | Refills: 0 | Status: SHIPPED | OUTPATIENT
Start: 2022-07-30

## 2022-08-16 ENCOUNTER — HOSPITAL ENCOUNTER (OUTPATIENT)
Dept: GENERAL RADIOLOGY | Age: 52
Discharge: HOME | End: 2022-08-16
Attending: INTERNAL MEDICINE
Payer: MEDICAID

## 2022-08-16 DIAGNOSIS — K21.9 GASTROESOPHAGEAL REFLUX DISEASE, UNSPECIFIED WHETHER ESOPHAGITIS PRESENT: ICD-10-CM

## 2022-08-16 DIAGNOSIS — R13.19 ESOPHAGEAL DYSPHAGIA: ICD-10-CM

## 2022-08-16 DIAGNOSIS — K50.111 CROHN'S DISEASE OF LARGE INTESTINE WITH RECTAL BLEEDING (HCC): ICD-10-CM

## 2022-08-16 PROCEDURE — 74220 X-RAY XM ESOPHAGUS 1CNTRST: CPT

## 2022-08-17 NOTE — PROGRESS NOTES
Result shows normal tablet swallowing -   Hiatal hernia indicates the passage in the diaphragm that allows the esophagus to pass from the chest to the abdomen is wider than typical, allowing part of the stomach to rise above the diaphragm. This is icnreasingly common with age, and is rarely indicated to repair surgically. It can be a reason for increased heartburn symptoms, and weight loss is typically the most helpful action to reduce these heartburn symptoms. If you would like a second opinion regarding these results - I would recommend meeting with a gastroenterologist.   Congratulations on an overall normal result.

## 2022-11-10 DIAGNOSIS — F33.41 RECURRENT MAJOR DEPRESSIVE DISORDER, IN PARTIAL REMISSION (HCC): ICD-10-CM

## 2022-11-10 DIAGNOSIS — F45.41 STRESS HEADACHES: ICD-10-CM

## 2022-11-10 DIAGNOSIS — E78.5 HYPERLIPIDEMIA, UNSPECIFIED HYPERLIPIDEMIA TYPE: ICD-10-CM

## 2022-11-10 DIAGNOSIS — F41.8 DEPRESSION WITH ANXIETY: ICD-10-CM

## 2022-11-10 RX ORDER — VENLAFAXINE HYDROCHLORIDE 150 MG/1
150 CAPSULE, EXTENDED RELEASE ORAL DAILY
Qty: 90 CAPSULE | Refills: 1 | Status: CANCELLED | OUTPATIENT
Start: 2022-11-10

## 2022-11-10 RX ORDER — AMITRIPTYLINE HYDROCHLORIDE 25 MG/1
25 TABLET, FILM COATED ORAL
Qty: 30 TABLET | Refills: 5 | Status: CANCELLED | OUTPATIENT
Start: 2022-11-10

## 2022-11-10 RX ORDER — ATORVASTATIN CALCIUM 20 MG/1
20 TABLET, FILM COATED ORAL
Qty: 30 TABLET | Refills: 5 | Status: CANCELLED | OUTPATIENT
Start: 2022-11-10

## 2022-11-11 NOTE — TELEPHONE ENCOUNTER
Duplicate request:   53/01/7895:  - Elavil #30 with 5 refills,   - Lipitor #30 with 5 refills,   - Effexor- mg #90 with 1 refill - these prescriptions where sent to the [facility]. For Pharmacy Admin Tracking Only    Intervention Detail: Discontinued Rx: 3, reason: Duplicate Therapy  Time Spent (min): 5      Requested Prescriptions     Pending Prescriptions Disp Refills    atorvastatin (LIPITOR) 20 mg tablet 30 Tablet 5     Sig: Take 1 Tablet by mouth nightly. amitriptyline (ELAVIL) 25 mg tablet 30 Tablet 5     Sig: Take 1 Tablet by mouth nightly. venlafaxine-SR (EFFEXOR-XR) 150 mg capsule 90 Capsule 1     Sig: Take 1 Capsule by mouth daily.

## 2022-11-11 NOTE — TELEPHONE ENCOUNTER
Duplicate request:   86/50/9112:  - Elavil #30 with 5 refills,   - Effexor- mg #90 with 1 refill - these prescriptions where sent to the [facility]. For Pharmacy Admin Tracking Only    Intervention Detail: Discontinued Rx: 2, reason: Duplicate Therapy  Time Spent (min): 5    Requested Prescriptions     Pending Prescriptions Disp Refills    amitriptyline (ELAVIL) 25 mg tablet 30 Tablet 5     Sig: Take 1 Tablet by mouth nightly. venlafaxine-SR (EFFEXOR-XR) 150 mg capsule 90 Capsule 1     Sig: Take 1 Capsule by mouth daily.

## 2022-11-17 RX ORDER — ARM BRACE
1 EACH MISCELLANEOUS DAILY
Qty: 1 EACH | Refills: 0 | Status: SHIPPED | OUTPATIENT
Start: 2022-11-17 | End: 2022-11-21

## 2022-11-17 NOTE — TELEPHONE ENCOUNTER
Requested Prescriptions     Pending Prescriptions Disp Refills    Arm Brace (Wrist Brace) misc 1 Each 0     Si Each by Does Not Apply route daily. Allergies   Allergen Reactions    Humira [Adalimumab] Rash    Remicade [Infliximab] Other (comments)     Throat closes up per pt         Current Outpatient Medications   Medication Instructions    amitriptyline (ELAVIL) 25 mg, Oral, EVERY BEDTIME,      Arm Brace (Wrist Brace) misc 1 Each, Does Not Apply, DAILY    aspirin delayed-release 325 mg tablet TAKE 1 TABLET BY MOUTH EVERY DAY    atorvastatin (LIPITOR) 20 mg, Oral, EVERY BEDTIME,      budesonide (ENTOCORT EC) 3 mg capsule No dose, route, or frequency recorded.     cholecalciferol (VITAMIN D3) 1,000 Units, Oral, DAILY    cyanocobalamin 1,000 mcg, Oral, DAILY    nystatin (MYCOSTATIN) powder Topical, 2 TIMES DAILY    nystatin (MYCOSTATIN) topical cream Topical, 3 TIMES DAILY    omeprazole (PRILOSEC) 40 mg, Oral, DAILY    ondansetron (ZOFRAN ODT) 8 mg, Oral, EVERY 8 HOURS AS NEEDED    venlafaxine-SR (EFFEXOR-XR) 150 mg, Oral, DAILY       Signed By: Lexis Ernandez     2022

## 2022-11-20 NOTE — PROGRESS NOTES
Subjective: (As above and below)     Chief Complaint   Patient presents with    Renetta Alexandru Thurston is a 45 y.o. female  [address] stroke in 6182 and presents to the office to establish care. She has run out of all of her medication for 2 months because Previous PCP passed away and other PCP no longer with practice. Preventative:   Pap smear:2018  Mammogram: Due  Colonoscopy:2021  DEXA: 2018    Anxiety/Depression:   She recently removed her and her children from home that she was living wit her  because  was an alcoholic and home was not stable. Therefore, currently staying at a friends house but is on the wait list for homeless shelter. Tried to reach out to  for help but was unsuccessful. Has recently been feeling very overwhelmed due the situation and ran out of medication for over 2 months because previous PCP passed away and/or left the practice. Current medication: None been off medication for about 2 months   Counselor/psychiatrist: No   Support system: Yes, daughter, older sister   Denies SI/HI      Stress headaches and Insomnia:   Was being treated with amitriptyline. Since being off medication she has been having headaches everyday especially now with home situation. The amitriptyline also helped with insomnia. Due to anxiety and feeling overwhelmed she has been having trouble falling asleep, \"can't turn my brain off. \" She has been trying to run errand and tire herself out but has been unsuccessful. Will go to bed at 5 am and wake up at 8 am   Had tried Ibuprofen PM which has not helped. Have tried sleep hygiene intervention with little success. UC, Crohn's and GERD:  Managed by GI: Dr. Deric Isabel at [facility]. Has appointment in the New Year. Only taking omeprazole but has run out. Currently being managed with diet. Last flare up was in early November.    Since then, denies fevers, chills, inability to eat, abdominal pain and blood in stool     Hx of CVA with left hemiparesis:   Embolic stroke in 5041. Was treated at [facility] and saw neurologist through [facility]. No longer following neurology. Per patient she did physical therapy for 6 months. Suppose to wear a AFO for foot drop but does not fit in any shoes she has. Does not use a walker/cane. She drives her self but does needs some help with bathing. No recent falls. She does feel like her leg strength has gotten weaker. Pelvic pain and urinary urgency:  Having symptoms for about 2 days. Has been taking OTC Azo. Unsure if it is helping. Denies fever, chills, dysuria, hematuria, frequency or flank pain. Reviewed and agree with Nurse Note duplicated in this note. Reviewed PmHx, RxHx, FmHx, SocHx, AllgHx and updated in chart. Current Outpatient Medications   Medication Sig    atorvastatin (LIPITOR) 20 mg tablet Take 1 Tablet by mouth nightly. omeprazole (PRILOSEC) 40 mg capsule Take 1 Capsule by mouth daily. venlafaxine-SR (EFFEXOR-XR) 75 mg capsule Take 1 Capsule by mouth daily. amitriptyline (ELAVIL) 25 mg tablet Take 1 Tablet by mouth nightly. aspirin delayed-release 325 mg tablet Take 1 Tablet by mouth daily. nitrofurantoin, macrocrystal-monohydrate, (MACROBID) 100 mg capsule Take 1 Capsule by mouth two (2) times a day for 5 days. No current facility-administered medications for this visit.       Allergies   Allergen Reactions    Mesalamine Other (comments)    Adalimumab Rash     Other reaction(s): ITCHING    Remicade [Infliximab] Other (comments)     Throat closes up per pt      Past Medical History:   Diagnosis Date    Arthritis     neck, back, hips & knees    Cerebrovascular accident (CVA) due to embolism of precerebral artery ([address] 10/23/2018    Chronic fatigue     Colitis     Crohn's colitis ([facility] [address]     De Quervain's tenosynovitis, bilateral     Depression     Fibromyalgia     Patellofemoral syndrome of both knees     Pedal edema     Radial styloid fracture Past Surgical History:   Procedure Laterality Date    HX WISDOM TEETH EXTRACTION      OH COLSC FLX W/NDSC US XM RCTM ET AL LMTD&ADJ STRUX        Family History   Problem Relation Age of Onset    Hypertension Mother     Psoriasis Mother     OSTEOARTHRITIS Mother     Lung Disease Mother     Cancer Father     Crohn's Disease Father     Alcohol abuse Father         Gene    Crohn's Disease Brother     Asthma Brother     Cancer Paternal Aunt     Cancer Paternal Uncle     Lung Disease Maternal Grandmother       Social History     Socioeconomic History    Marital status:      Spouse name: Not on file    Number of children: Not on file    Years of education: Not on file    Highest education level: Not on file   Occupational History    Not on file   Tobacco Use    Smoking status: Light Smoker     Packs/day: 1.00     Years: 15.00     Pack years: 15.00     Types: Cigarettes    Smokeless tobacco: Never    Tobacco comments:     quit 9/3/18   Substance and Sexual Activity    Alcohol use: No    Drug use: No    Sexual activity: Yes     Partners: Male     Birth control/protection: None   Other Topics Concern    Not on file   Social History Narrative    Not on file     Social Determinants of Health     Financial Resource Strain: Not on file   Food Insecurity: Not on file   Transportation Needs: Not on file   Physical Activity: Unknown    Days of Exercise per Week: 0 days    Minutes of Exercise per Session: Not on file   Stress: Not on file   Social Connections: Not on file   Intimate Partner Violence: Unknown    Fear of Current or Ex-Partner: Patient refused    Emotionally Abused: Patient refused    Physically Abused: Patient refused    Sexually Abused: No   Housing Stability: Not on file             Review of Systems   Constitutional:  Negative for chills, diaphoresis, fever, malaise/fatigue and weight loss. HENT: Negative. Eyes: Negative. Respiratory:  Negative for cough and shortness of breath. Cardiovascular:  Negative for chest pain, palpitations and leg swelling. Gastrointestinal:  Negative for abdominal pain, blood in stool, constipation, diarrhea, heartburn, melena, nausea and vomiting. Genitourinary:  Positive for frequency. Negative for dysuria, flank pain, hematuria and urgency. Musculoskeletal: Negative. Negative for falls. Skin: Negative. Neurological:  Negative for dizziness, tingling, tremors, sensory change, speech change, focal weakness, seizures, loss of consciousness, weakness and headaches. Endo/Heme/Allergies: Negative. Psychiatric/Behavioral:  Positive for depression. Negative for substance abuse and suicidal ideas. The patient is nervous/anxious and has insomnia. Objective:     Physical Examination:    Visit Vitals  /78 (BP 1 Location: Right arm, BP Patient Position: Sitting, BP Cuff Size: Adult) Comment: manual   Pulse 90   Temp 98.3 °F (36.8 °C) (Temporal)   Resp 18   Ht 5' 4\" (1.626 m)   Wt 171 lb (77.6 kg)   SpO2 97%   BMI 29.35 kg/m²       Physical Exam  Vitals and nursing note reviewed. Constitutional:       General: She is not in acute distress. Appearance: Normal appearance. HENT:      Head: Normocephalic. Right Ear: Tympanic membrane, ear canal and external ear normal.      Left Ear: Tympanic membrane, ear canal and external ear normal.      Nose: Nose normal.      Mouth/Throat:      Mouth: Mucous membranes are moist.      Pharynx: Oropharynx is clear. Eyes:      Conjunctiva/sclera: Conjunctivae normal.      Pupils: Pupils are equal, round, and reactive to light. Neck:      Thyroid: No thyromegaly or thyroid tenderness. Vascular: No carotid bruit. Cardiovascular:      Rate and Rhythm: Normal rate and regular rhythm. Pulses: Normal pulses. Heart sounds: Normal heart sounds. Pulmonary:      Effort: Pulmonary effort is normal. No respiratory distress. Breath sounds: Normal breath sounds.    Abdominal: General: Bowel sounds are normal. There is no distension. Palpations: Abdomen is soft. There is no mass. Tenderness: There is no abdominal tenderness. There is no right CVA tenderness, left CVA tenderness, guarding or rebound. Hernia: No hernia is present. Musculoskeletal:      Cervical back: No tenderness. Right lower leg: No edema. Left lower leg: No edema. Comments: Left upper extremity: Patient does have contracture of left hand but able to passively straighten fingers without difficulty or pain. She is able to squeeze her left pinky and ring finger and strength is equal bilaterally. Left lower extremity: Decrease strength in left leg. She does walk with a limp where left leg stays straight. Today she is wearing hard sole tennis shoes. Lymphadenopathy:      Cervical: No cervical adenopathy. Skin:     General: Skin is warm and dry. Neurological:      General: No focal deficit present. Mental Status: She is alert and oriented to person, place, and time. Mental status is at baseline. Cranial Nerves: Cranial nerves 2-12 are intact. Motor: Motor function is intact. Coordination: Coordination is intact. Deep Tendon Reflexes: Reflexes abnormal.      Reflex Scores:       Tricep reflexes are 2+ on the right side and 3+ on the left side. Bicep reflexes are 2+ on the right side and 3+ on the left side. Brachioradialis reflexes are 2+ on the right side and 3+ on the left side. Patellar reflexes are 2+ on the right side and 3+ on the left side. Psychiatric:         Mood and Affect: Mood normal.         Behavior: Behavior normal.         Thought Content:  Thought content normal.         Judgment: Judgment normal.           3 most recent PHQ Screens 11/21/2022   Little interest or pleasure in doing things Nearly every day   Feeling down, depressed, irritable, or hopeless Nearly every day   Total Score PHQ 2 6   Trouble falling or staying asleep, or sleeping too much Nearly every day   Feeling tired or having little energy Nearly every day   Poor appetite, weight loss, or overeating Several days   Feeling bad about yourself - or that you are a failure or have let yourself or your family down Nearly every day   Trouble concentrating on things such as school, work, reading, or watching TV Nearly every day   Moving or speaking so slowly that other people could have noticed; or the opposite being so fidgety that others notice Nearly every day   Thoughts of being better off dead, or hurting yourself in some way Not at all   PHQ 9 Score 22   How difficult have these problems made it for you to do your work, take care of your home and get along with others Very difficult      Assessment/ Plan:   Differential diagnosis and treatment options reviewed with patient who is in agreement with treatment plan as outlined below. 1. Encounter for medical examination to establish care  Vitals are stable. Reconsider flu vaccine, refused today. 2. Laboratory exam ordered as part of routine general medical examination  - CBC WITH AUTOMATED DIFF; Future  - METABOLIC PANEL, COMPREHENSIVE; Future  - HEMOGLOBIN A1C WITH EAG; Future    3. Depression with anxiety  Restart effexor at lower dose. Discussed expected benefits vs possible side effects of SSRIs. Explained maximal effect may not be noted for 6 weeks. Discussed possibility of increased suicidal tendencies during onset of action. Patient contracts for safety and can identify an accessible social support network. Patient underdstands that medication is more effective when partnered with counseling. Follow up appointment scheduled for 1 month. - venlafaxine-SR (EFFEXOR-XR) 75 mg capsule; Take 1 Capsule by mouth daily. Dispense: 60 Capsule; Refill: 0    4. Crohn's disease of large intestine with rectal bleeding ([facility] [address]  Continue to follow-up with Dr. Sary Vargas   - omeprazole (PRILOSEC) 40 mg capsule;  Take 1 Capsule by mouth daily. Dispense: 90 Capsule; Refill: 0    5. Ulcerative colitis with complication, unspecified location [facility])  Continue to follow-up with Dr. Arthur Graff   - omeprazole (PRILOSEC) 40 mg capsule; Take 1 Capsule by mouth daily. Dispense: 90 Capsule; Refill: 0    6. Gastroesophageal reflux disease, unspecified whether esophagitis present  Continue to follow-up with Dr. Arthur Graff   - omeprazole (PRILOSEC) 40 mg capsule; Take 1 Capsule by mouth daily. Dispense: 90 Capsule; Refill: 0    7. Thyroid nodule  - TSH 3RD GENERATION; Future    8. Cerebrovascular accident (CVA) due to embolism of precerebral artery (HCC)  Restart lipitor. LDL goal< 70  Restart  mg, patient is tolerating medication at this time. Patient has UC and Crohn's will have to take into consideration risk vs benefits. Patient is almost 5 years from last stroke. Discussed with patient about the risk of continual tobacco use. Patient expressed understanding of risk. Patient will follow-up if she would like alternative nicotine therapies. - atorvastatin (LIPITOR) 20 mg tablet; Take 1 Tablet by mouth nightly. Dispense: 90 Tablet; Refill: 0  - aspirin delayed-release 325 mg tablet; Take 1 Tablet by mouth daily. Dispense: 30 Tablet; Refill: 5    9. Left hemiparesis ([address]  Patient could benefit from re-evaluation of foot drop and need for walker/cane. Patient advised to wear hard sole shoes to help support foot drop.   - REFERRAL TO PHYSICAL THERAPY    10. Hyperlipidemia, unspecified hyperlipidemia type  - atorvastatin (LIPITOR) 20 mg tablet; Take 1 Tablet by mouth nightly. Dispense: 90 Tablet; Refill: 0  - LIPID PANEL; Future    11. Stress headaches  - amitriptyline (ELAVIL) 25 mg tablet; Take 1 Tablet by mouth nightly. Dispense: 30 Tablet; Refill: 2    12. Vitamin D deficiency  - VITAMIN D, 25 HYDROXY; Future    13. Vitamin B 12 deficiency  - VITAMIN B12; Future    14.  Pelvic pain  - AMB POC URINALYSIS DIP STICK AUTO W/O MICRO    15. Urinary urgency  - AMB POC URINALYSIS DIP STICK AUTO W/O MICRO    16. Acute cystitis without hematuria  - nitrofurantoin, macrocrystal-monohydrate, (MACROBID) 100 mg capsule; Take 1 Capsule by mouth two (2) times a day for 5 days. Dispense: 10 Capsule; Refill: 0  - CULTURE, URINE; Future  - CULTURE, URINE        Follow-up and Dispositions    Return in about 4 weeks (around 12/19/2022), or if symptoms worsen or fail to improve, for depression follow-up . Medication Side Effects and Warnings were discussed with patient: yes  Patient Labs were reviewed: yes  Patient Past Records were reviewed:  yes    Verbal and written instructions (see AVS) provided. Patient expresses understanding and agreement of diagnosis and treatment plan.     Socorro [place] NP

## 2022-11-21 ENCOUNTER — OFFICE VISIT (OUTPATIENT)
Dept: FAMILY MEDICINE CLINIC | Age: 52
End: 2022-11-21
Payer: MEDICAID

## 2022-11-21 VITALS
SYSTOLIC BLOOD PRESSURE: 128 MMHG | HEART RATE: 90 BPM | WEIGHT: 171 LBS | BODY MASS INDEX: 29.19 KG/M2 | RESPIRATION RATE: 18 BRPM | TEMPERATURE: 98.3 F | DIASTOLIC BLOOD PRESSURE: 78 MMHG | HEIGHT: 64 IN | OXYGEN SATURATION: 97 %

## 2022-11-21 DIAGNOSIS — Z00.00 ENCOUNTER FOR MEDICAL EXAMINATION TO ESTABLISH CARE: Primary | ICD-10-CM

## 2022-11-21 DIAGNOSIS — G81.94 LEFT HEMIPARESIS (HCC): ICD-10-CM

## 2022-11-21 DIAGNOSIS — N30.00 ACUTE CYSTITIS WITHOUT HEMATURIA: ICD-10-CM

## 2022-11-21 DIAGNOSIS — R10.2 PELVIC PAIN: ICD-10-CM

## 2022-11-21 DIAGNOSIS — I63.10 CEREBROVASCULAR ACCIDENT (CVA) DUE TO EMBOLISM OF PRECEREBRAL ARTERY (HCC): ICD-10-CM

## 2022-11-21 DIAGNOSIS — K51.919 ULCERATIVE COLITIS WITH COMPLICATION, UNSPECIFIED LOCATION (HCC): ICD-10-CM

## 2022-11-21 DIAGNOSIS — F41.8 DEPRESSION WITH ANXIETY: ICD-10-CM

## 2022-11-21 DIAGNOSIS — K50.111 CROHN'S DISEASE OF LARGE INTESTINE WITH RECTAL BLEEDING (HCC): ICD-10-CM

## 2022-11-21 DIAGNOSIS — E55.9 VITAMIN D DEFICIENCY: ICD-10-CM

## 2022-11-21 DIAGNOSIS — E04.1 THYROID NODULE: ICD-10-CM

## 2022-11-21 DIAGNOSIS — E53.8 VITAMIN B 12 DEFICIENCY: ICD-10-CM

## 2022-11-21 DIAGNOSIS — E78.5 HYPERLIPIDEMIA, UNSPECIFIED HYPERLIPIDEMIA TYPE: ICD-10-CM

## 2022-11-21 DIAGNOSIS — K21.9 GASTROESOPHAGEAL REFLUX DISEASE, UNSPECIFIED WHETHER ESOPHAGITIS PRESENT: ICD-10-CM

## 2022-11-21 DIAGNOSIS — Z00.00 LABORATORY EXAM ORDERED AS PART OF ROUTINE GENERAL MEDICAL EXAMINATION: ICD-10-CM

## 2022-11-21 DIAGNOSIS — F45.41 STRESS HEADACHES: ICD-10-CM

## 2022-11-21 DIAGNOSIS — R39.15 URINARY URGENCY: ICD-10-CM

## 2022-11-21 PROBLEM — M25.562 LEFT KNEE PAIN: Status: ACTIVE | Noted: 2022-11-21

## 2022-11-21 PROBLEM — K51.011: Status: ACTIVE | Noted: 2022-11-21

## 2022-11-21 PROBLEM — I63.9 ISCHEMIC STROKE (HCC): Status: ACTIVE | Noted: 2018-10-23

## 2022-11-21 LAB
BILIRUB UR QL STRIP: NEGATIVE
GLUCOSE UR-MCNC: NORMAL MG/DL
KETONES P FAST UR STRIP-MCNC: NEGATIVE MG/DL
PH UR STRIP: 7 [PH] (ref 4.6–8)
PROT UR QL STRIP: NORMAL
SP GR UR STRIP: 1.01 (ref 1–1.03)
UA UROBILINOGEN AMB POC: NORMAL (ref 0.2–1)
URINALYSIS CLARITY POC: NORMAL
URINALYSIS COLOR POC: NORMAL
URINE BLOOD POC: NORMAL
URINE LEUKOCYTES POC: NORMAL
URINE NITRITES POC: POSITIVE

## 2022-11-21 PROCEDURE — 81003 URINALYSIS AUTO W/O SCOPE: CPT

## 2022-11-21 PROCEDURE — 99214 OFFICE O/P EST MOD 30 MIN: CPT

## 2022-11-21 RX ORDER — OMEPRAZOLE 40 MG/1
40 CAPSULE, DELAYED RELEASE ORAL DAILY
Qty: 90 CAPSULE | Refills: 0 | Status: SHIPPED | OUTPATIENT
Start: 2022-11-21

## 2022-11-21 RX ORDER — VENLAFAXINE HYDROCHLORIDE 75 MG/1
75 CAPSULE, EXTENDED RELEASE ORAL DAILY
Qty: 60 CAPSULE | Refills: 0 | Status: SHIPPED | OUTPATIENT
Start: 2022-11-21

## 2022-11-21 RX ORDER — ASPIRIN 325 MG
325 TABLET, DELAYED RELEASE (ENTERIC COATED) ORAL DAILY
Qty: 30 TABLET | Refills: 5 | Status: SHIPPED | OUTPATIENT
Start: 2022-11-21

## 2022-11-21 RX ORDER — AMITRIPTYLINE HYDROCHLORIDE 25 MG/1
25 TABLET, FILM COATED ORAL
Qty: 30 TABLET | Refills: 2 | Status: SHIPPED | OUTPATIENT
Start: 2022-11-21

## 2022-11-21 RX ORDER — ATORVASTATIN CALCIUM 20 MG/1
20 TABLET, FILM COATED ORAL
Qty: 90 TABLET | Refills: 0 | Status: SHIPPED | OUTPATIENT
Start: 2022-11-21

## 2022-11-21 RX ORDER — NITROFURANTOIN 25; 75 MG/1; MG/1
100 CAPSULE ORAL 2 TIMES DAILY
Qty: 10 CAPSULE | Refills: 0 | Status: SHIPPED | OUTPATIENT
Start: 2022-11-21 | End: 2022-11-26

## 2022-11-21 NOTE — PROGRESS NOTES
Chief Complaint   Patient presents with    [address] Maintenance reviewed     1. Have you been to the ER, urgent care clinic since your last visit? Hospitalized since your last visit? No     2. Have you seen or consulted any other health care providers outside of the [address] [place] [address] since your last visit? Include any pap smears or colon screening.   No

## 2022-11-21 NOTE — PATIENT INSTRUCTIONS
[address]  [phone]  [facility] [place] [address] [phone] [address] [facility], [place]  Alecia [address] crisis- [phone]

## 2022-11-22 ENCOUNTER — TELEPHONE (OUTPATIENT)
Dept: FAMILY MEDICINE CLINIC | Age: 52
End: 2022-11-22

## 2022-11-22 DIAGNOSIS — E78.5 HYPERLIPIDEMIA, UNSPECIFIED HYPERLIPIDEMIA TYPE: ICD-10-CM

## 2022-11-22 DIAGNOSIS — E55.9 VITAMIN D DEFICIENCY: ICD-10-CM

## 2022-11-22 DIAGNOSIS — R79.89 ELEVATED SERUM CREATININE: ICD-10-CM

## 2022-11-22 DIAGNOSIS — E04.1 THYROID NODULE: ICD-10-CM

## 2022-11-22 DIAGNOSIS — E53.8 VITAMIN B 12 DEFICIENCY: ICD-10-CM

## 2022-11-22 DIAGNOSIS — Z00.00 LABORATORY EXAM ORDERED AS PART OF ROUTINE GENERAL MEDICAL EXAMINATION: ICD-10-CM

## 2022-11-22 NOTE — TELEPHONE ENCOUNTER
Pt is calling to inform that the pharmacies local to her either dont accept her insurance or dont have the arm brace.     Pt has tried     [address] (to far for pt)

## 2022-11-23 LAB
25(OH)D3 SERPL-MCNC: 22 NG/ML (ref 30–100)
ALBUMIN SERPL-MCNC: 3.4 G/DL (ref 3.5–5)
ALBUMIN/GLOB SERPL: 0.9 {RATIO} (ref 1.1–2.2)
ALP SERPL-CCNC: 84 U/L (ref 45–117)
ALT SERPL-CCNC: 20 U/L (ref 12–78)
ANION GAP SERPL CALC-SCNC: 7 MMOL/L (ref 5–15)
AST SERPL-CCNC: 15 U/L (ref 15–37)
BASOPHILS # BLD: 0.1 K/UL (ref 0–0.1)
BASOPHILS NFR BLD: 1 % (ref 0–1)
BILIRUB SERPL-MCNC: 0.4 MG/DL (ref 0.2–1)
BUN SERPL-MCNC: 11 MG/DL (ref 6–20)
BUN/CREAT SERPL: 13 (ref 12–20)
CALCIUM SERPL-MCNC: 8.9 MG/DL (ref 8.5–10.1)
CHLORIDE SERPL-SCNC: 106 MMOL/L (ref 97–108)
CHOLEST SERPL-MCNC: 209 MG/DL
CO2 SERPL-SCNC: 27 MMOL/L (ref 21–32)
CREAT SERPL-MCNC: 0.86 MG/DL (ref 0.55–1.02)
DIFFERENTIAL METHOD BLD: ABNORMAL
EOSINOPHIL # BLD: 0 K/UL (ref 0–0.4)
EOSINOPHIL NFR BLD: 0 % (ref 0–7)
ERYTHROCYTE [DISTWIDTH] IN BLOOD BY AUTOMATED COUNT: 12.3 % (ref 11.5–14.5)
EST. AVERAGE GLUCOSE BLD GHB EST-MCNC: 100 MG/DL
GLOBULIN SER CALC-MCNC: 3.8 G/DL (ref 2–4)
GLUCOSE SERPL-MCNC: 90 MG/DL (ref 65–100)
HBA1C MFR BLD: 5.1 % (ref 4–5.6)
HCT VFR BLD AUTO: 40.9 % (ref 35–47)
HDLC SERPL-MCNC: 51 MG/DL
HDLC SERPL: 4.1 {RATIO} (ref 0–5)
HGB BLD-MCNC: 12.8 G/DL (ref 11.5–16)
IMM GRANULOCYTES # BLD AUTO: 0 K/UL (ref 0–0.04)
IMM GRANULOCYTES NFR BLD AUTO: 0 % (ref 0–0.5)
LDLC SERPL CALC-MCNC: 126 MG/DL (ref 0–100)
LYMPHOCYTES # BLD: 2.2 K/UL (ref 0.8–3.5)
LYMPHOCYTES NFR BLD: 24 % (ref 12–49)
MCH RBC QN AUTO: 28.6 PG (ref 26–34)
MCHC RBC AUTO-ENTMCNC: 31.3 G/DL (ref 30–36.5)
MCV RBC AUTO: 91.3 FL (ref 80–99)
MONOCYTES # BLD: 0.4 K/UL (ref 0–1)
MONOCYTES NFR BLD: 5 % (ref 5–13)
NEUTS SEG # BLD: 6.4 K/UL (ref 1.8–8)
NEUTS SEG NFR BLD: 70 % (ref 32–75)
NRBC # BLD: 0 K/UL (ref 0–0.01)
NRBC BLD-RTO: 0 PER 100 WBC
PLATELET # BLD AUTO: 439 K/UL (ref 150–400)
PMV BLD AUTO: 9.9 FL (ref 8.9–12.9)
POTASSIUM SERPL-SCNC: 4.2 MMOL/L (ref 3.5–5.1)
PROT SERPL-MCNC: 7.2 G/DL (ref 6.4–8.2)
RBC # BLD AUTO: 4.48 M/UL (ref 3.8–5.2)
SODIUM SERPL-SCNC: 140 MMOL/L (ref 136–145)
TRIGL SERPL-MCNC: 160 MG/DL (ref ?–150)
TSH SERPL DL<=0.05 MIU/L-ACNC: 2.48 UIU/ML (ref 0.36–3.74)
VIT B12 SERPL-MCNC: 319 PG/ML (ref 193–986)
VLDLC SERPL CALC-MCNC: 32 MG/DL
WBC # BLD AUTO: 9.1 K/UL (ref 3.6–11)

## 2022-11-25 ENCOUNTER — PATIENT MESSAGE (OUTPATIENT)
Dept: FAMILY MEDICINE CLINIC | Age: 52
End: 2022-11-25

## 2022-11-25 NOTE — PROGRESS NOTES
LDL and triglyceride are elevated compared to 11 months ago but patient has been off medication for several months. Restarted medication during our visit and suspect this will come down. We can recheck this lab in 3-6 months. However, I still encourage that she increase healthy diet such as chicken/fish, whole grain, vegetables and fruit and decrease refined sugars and sat/trans fats. Exercise as tolerated. Vitamin D is low. I would encourage her to take an OTC Vitamin D supplement of at least 2000iu a day. Urine culture did show bacteria growing. She was already being treated with macrobid which was appropriate treatment. Other labs are unremarkable.

## 2022-11-25 NOTE — PROGRESS NOTES
Unable to reach patient in regards to labs. Sent results through my chart. Left voicemail to call back if needed.

## 2022-11-26 LAB
BACTERIA SPEC CULT: ABNORMAL
CC UR VC: ABNORMAL
SERVICE CMNT-IMP: ABNORMAL

## 2022-11-29 ENCOUNTER — TELEPHONE (OUTPATIENT)
Dept: FAMILY MEDICINE CLINIC | Age: 52
End: 2022-11-29

## 2022-12-02 DIAGNOSIS — B37.2 YEAST INFECTION OF THE SKIN: Primary | ICD-10-CM

## 2022-12-02 RX ORDER — NYSTATIN 100000 U/G
CREAM TOPICAL 2 TIMES DAILY
Qty: 15 G | Refills: 0 | Status: SHIPPED | OUTPATIENT
Start: 2022-12-02

## 2022-12-02 NOTE — TELEPHONE ENCOUNTER
verified. Informed pt of message from provider regarding labs results. Pt verified understanding and stated she saw them in her my chart. Also informed pt of message from provider regarding arm brace. Informed pt she would need to get in touch with the provider who ordered it for her. Pt verified understanding and asked about nystatin for yeast infection under arm. Informed pt we sent the message from [employer] to provider and and provider will respond through her [employer] message. Pt had no further questions or concerns.

## 2022-12-02 NOTE — PROGRESS NOTES
verified. Informed pt of message from provider regarding labs results. Pt verified understanding and stated she saw them in her my chart. Also informed pt of message from provider regarding arm brace. Informed pt she would need to get in touch with the provider who ordered it for her. Pt verified understanding and asked about nystatin for yeast infection under arm. Informed pt we sent the message from [employer] to provider and and provider will respond through her [employer] message. Pt had no further questions or concerns.

## 2022-12-12 ENCOUNTER — OFFICE VISIT (OUTPATIENT)
Dept: FAMILY MEDICINE CLINIC | Age: 52
End: 2022-12-12
Payer: MEDICAID

## 2022-12-12 VITALS
HEIGHT: 64 IN | DIASTOLIC BLOOD PRESSURE: 62 MMHG | BODY MASS INDEX: 29.71 KG/M2 | WEIGHT: 174 LBS | OXYGEN SATURATION: 97 % | HEART RATE: 90 BPM | TEMPERATURE: 98.3 F | RESPIRATION RATE: 17 BRPM | SYSTOLIC BLOOD PRESSURE: 118 MMHG

## 2022-12-12 DIAGNOSIS — Z23 NEEDS FLU SHOT: ICD-10-CM

## 2022-12-12 DIAGNOSIS — F41.8 DEPRESSION WITH ANXIETY: Primary | ICD-10-CM

## 2022-12-12 PROBLEM — R51.9 HEADACHE: Status: ACTIVE | Noted: 2022-12-12

## 2022-12-12 PROCEDURE — 90686 IIV4 VACC NO PRSV 0.5 ML IM: CPT

## 2022-12-12 PROCEDURE — 99213 OFFICE O/P EST LOW 20 MIN: CPT

## 2022-12-12 NOTE — PROGRESS NOTES
Rodolfo Mak is a 45 y.o. female  and presents with   Chief Complaint   Patient presents with    Medication Evaluation     4 week follow-up      Anxiety/Depression:   Feeling really good today. Has registered her kids in school. Looking to see if she can get a housing voucher to get an apartment. Current medication: Effexor 75 mg and amitriptyline 25 mg   Compliance: Yes   Side effects: None   Counselor/psychiatrist: Not at this time. Coping mechanisms: Yes   Support system: Yes  Denies SI/HI      Current Outpatient Medications on File Prior to Visit   Medication Sig Dispense Refill    nystatin (MYCOSTATIN) topical cream Apply  to affected area two (2) times a day. 15 g 0    atorvastatin (LIPITOR) 20 mg tablet Take 1 Tablet by mouth nightly. 90 Tablet 0    omeprazole (PRILOSEC) 40 mg capsule Take 1 Capsule by mouth daily. 90 Capsule 0    venlafaxine-SR (EFFEXOR-XR) 75 mg capsule Take 1 Capsule by mouth daily. 60 Capsule 0    amitriptyline (ELAVIL) 25 mg tablet Take 1 Tablet by mouth nightly. 30 Tablet 2    aspirin delayed-release 325 mg tablet Take 1 Tablet by mouth daily. 30 Tablet 5     No current facility-administered medications on file prior to visit.       Past Medical History:   Diagnosis Date    Arthritis     neck, back, hips & knees    Cerebrovascular accident (CVA) due to embolism of precerebral artery ([facility] [address] 10/23/2018    Chronic fatigue     Colitis     Crohn's colitis ([facility] [address]     De Quervain's tenosynovitis, bilateral     Depression     Fibromyalgia     Patellofemoral syndrome of both knees     Pedal edema     Radial styloid fracture      Past Surgical History:   Procedure Laterality Date    HX WISDOM TEETH EXTRACTION      CT COLSC FLX W/NDSC US XM RCTM ET AL LMTD&ADJ STRUX       Social History     Socioeconomic History    Marital status:      Spouse name: Not on file    Number of children: Not on file    Years of education: Not on file    Highest education level: Not on file   Occupational History    Not on file   Tobacco Use    Smoking status: Light Smoker     Packs/day: 1.00     Years: 15.00     Pack years: 15.00     Types: Cigarettes    Smokeless tobacco: Never    Tobacco comments:     quit 9/3/18   Substance and Sexual Activity    Alcohol use: No    Drug use: No    Sexual activity: Yes     Partners: Male     Birth control/protection: None   Other Topics Concern    Not on file   Social History Narrative    Not on file     Social Determinants of Health     Financial Resource Strain: Not on file   Food Insecurity: Not on file   Transportation Needs: Not on file   Physical Activity: Unknown    Days of Exercise per Week: 0 days    Minutes of Exercise per Session: Not on file   Stress: Not on file   Social Connections: Not on file   Intimate Partner Violence: Unknown    Fear of Current or Ex-Partner: Patient refused    Emotionally Abused: Patient refused    Physically Abused: Patient refused    Sexually Abused: No   Housing Stability: Not on file     Allergies   Allergen Reactions    Infliximab Other (comments)     Throat closes up per pt  Other reaction(s): THROAT SWELLING    Mesalamine Other (comments)    Adalimumab Rash     Other reaction(s): ITCHING       Review of Systems   Constitutional:  Negative for chills, diaphoresis, fever, malaise/fatigue and weight loss. HENT: Negative. Eyes: Negative. Respiratory:  Negative for cough and shortness of breath. Cardiovascular:  Negative for chest pain, palpitations and leg swelling. Gastrointestinal:  Negative for abdominal pain, blood in stool, constipation, diarrhea, heartburn, melena, nausea and vomiting. Genitourinary:  Negative for dysuria, flank pain, frequency, hematuria and urgency. Musculoskeletal: Negative. Skin: Negative. Neurological:  Negative for dizziness, tingling, weakness and headaches. Endo/Heme/Allergies: Negative. Psychiatric/Behavioral: Negative.             Physical Examination:    Visit Vitals  /62 (BP 1 Location: Right arm, BP Patient Position: Sitting, BP Cuff Size: Adult)   Pulse 90   Temp 98.3 °F (36.8 °C) (Temporal)   Resp 17   Ht 5' 4\" (1.626 m)   Wt 174 lb (78.9 kg)   SpO2 97%   BMI 29.87 kg/m²      Physical Exam  Vitals and nursing note reviewed. Constitutional:       General: She is not in acute distress. Appearance: Normal appearance. HENT:      Head: Normocephalic. Right Ear: Tympanic membrane, ear canal and external ear normal.      Left Ear: Tympanic membrane, ear canal and external ear normal.      Nose: Nose normal.      Mouth/Throat:      Mouth: Mucous membranes are moist.      Pharynx: Oropharynx is clear. Eyes:      Extraocular Movements: Extraocular movements intact. Conjunctiva/sclera: Conjunctivae normal.      Pupils: Pupils are equal, round, and reactive to light. Neck:      Thyroid: No thyromegaly or thyroid tenderness. Vascular: No carotid bruit. Cardiovascular:      Rate and Rhythm: Normal rate and regular rhythm. Pulses: Normal pulses. Heart sounds: Normal heart sounds. Pulmonary:      Effort: Pulmonary effort is normal. No respiratory distress. Breath sounds: Normal breath sounds. Abdominal:      General: Bowel sounds are normal. There is no distension. Palpations: Abdomen is soft. Tenderness: There is no abdominal tenderness. Musculoskeletal:      Cervical back: No tenderness. Right lower leg: No edema. Left lower leg: No edema. Lymphadenopathy:      Cervical: No cervical adenopathy. Skin:     General: Skin is warm and dry. Neurological:      General: No focal deficit present. Mental Status: She is alert and oriented to person, place, and time. Mental status is at baseline. Psychiatric:         Mood and Affect: Mood normal.         Behavior: Behavior normal.         Thought Content:  Thought content normal.         Judgment: Judgment normal.      3 most recent PHQ Screens 12/12/2022   Little interest or pleasure in doing things Several days   Feeling down, depressed, irritable, or hopeless Not at all   Total Score PHQ 2 1   Trouble falling or staying asleep, or sleeping too much Not at all   Feeling tired or having little energy Several days   Poor appetite, weight loss, or overeating Not at all   Feeling bad about yourself - or that you are a failure or have let yourself or your family down Not at all   Trouble concentrating on things such as school, work, reading, or watching TV Not at all   Moving or speaking so slowly that other people could have noticed; or the opposite being so fidgety that others notice Not at all   Thoughts of being better off dead, or hurting yourself in some way Not at all   PHQ 9 Score 2   How difficult have these problems made it for you to do your work, take care of your home and get along with others -        Assessment/Plan:  Differential diagnosis and treatment options reviewed with patient who is in agreement with treatment plan as outlined below. ICD-10-CM ICD-9-CM    1. Depression with anxiety  F41.8 300.4       2. Needs flu shot  Z23 V04.81 INFLUENZA, FLUARIX, FLULAVAL, FLUZONE (AGE 6 MO+), AFLURIA(AGE 3Y+) IM, PF, 0.5 ML      MA IMMUNIZ ADMIN,1 SINGLE/COMB VAC/TOXOID      Depression and anxiety are well controlled, continue with medication. Follow-up if mood becomes uncontrolled. Contracts to safety and aware to go to ED if having SI/HI thoughts. Follow-up in 3-6 months    Medication Side Effects and Warnings were discussed with patient: yes  Patient Labs were reviewed: yes  Patient Past Records were reviewed:  yes    Verbal and written instructions (see AVS) provided. Patient expresses understanding and agreement of diagnosis and treatment plan.     Rolando King NP

## 2022-12-12 NOTE — PATIENT INSTRUCTIONS
Vaccine Information Statement    Influenza (Flu) Vaccine (Inactivated or Recombinant): What You Need to Know    Many vaccine information statements are available in [place] and other languages. See www.immunize.org/vis. Hojas de información sobre vacunas están disponibles en español y en muchos otros idiomas. Visite www.immunize.org/vis. 1. Why get vaccinated? Influenza vaccine can prevent influenza (flu). Flu is a contagious disease that spreads around the [facility] every year, usually between October and May. Anyone can get the flu, but it is more dangerous for some people. Infants and young children, people 72 years and older, pregnant people, and people with certain health conditions or a weakened immune system are at greatest risk of flu complications. Pneumonia, bronchitis, sinus infections, and ear infections are examples of flu-related complications. If you have a medical condition, such as heart disease, cancer, or diabetes, flu can make it worse. Flu can cause fever and chills, sore throat, muscle aches, fatigue, cough, headache, and runny or stuffy nose. Some people may have vomiting and diarrhea, though this is more common in children than adults. In an average year, thousands of people in the [facility] die from flu, and many more are hospitalized. Flu vaccine prevents millions of illnesses and flu-related visits to the doctor each year. 2. Influenza vaccines     CDC recommends everyone 6 months and older get vaccinated every flu season. Children 6 months through 6years of age may need 2 doses during a single flu season. Everyone else needs only 1 dose each flu season. It takes about 2 weeks for protection to develop after vaccination. There are many flu viruses, and they are always changing. Each year a new flu vaccine is made to protect against the influenza viruses believed to be likely to cause disease in the upcoming flu season.  Even when the vaccine doesnt exactly match these viruses, it may still provide some protection. Influenza vaccine does not cause flu. Influenza vaccine may be given at the same time as other vaccines. 3. Talk with your health care provider    Tell your vaccination provider if the person getting the vaccine:  Has had an allergic reaction after a previous dose of influenza vaccine, or has any severe, life-threatening allergies   Has ever had Guillain-Barré Syndrome (also called GBS)    In some cases, your health care provider may decide to postpone influenza vaccination until a future visit. Influenza vaccine can be administered at any time during pregnancy. People who are or will be pregnant during influenza season should receive inactivated influenza vaccine. People with minor illnesses, such as a cold, may be vaccinated. People who are moderately or severely ill should usually wait until they recover before getting influenza vaccine. Your health care provider can give you more information. 4. Risks of a vaccine reaction    Soreness, redness, and swelling where the shot is given, fever, muscle aches, and headache can happen after influenza vaccination. There may be a very small increased risk of Guillain-Barré Syndrome (GBS) after inactivated influenza vaccine (the flu shot). Thanh Bruner children who get the flu shot along with pneumococcal vaccine (PCV13) and/or DTaP vaccine at the same time might be slightly more likely to have a seizure caused by fever. Tell your health care provider if a child who is getting flu vaccine has ever had a seizure. People sometimes faint after medical procedures, including vaccination. Tell your provider if you feel dizzy or have vision changes or ringing in the ears. As with any medicine, there is a very remote chance of a vaccine causing a severe allergic reaction, other serious injury, or death. 5. What if there is a serious problem?     An allergic reaction could occur after the vaccinated person leaves the clinic. If you see signs of a severe allergic reaction (hives, swelling of the face and throat, difficulty breathing, a fast heartbeat, dizziness, or weakness), call 9-1-1 and get the person to the nearest hospital.    For other signs that concern you, call your health care provider. Adverse reactions should be reported to the Vaccine Adverse Event Reporting System (VAERS). Your health care provider will usually file this report, or you can do it yourself. Visit the VAERS website at www.vaers. [facility].gov or call 0-330.918.7517. VAERS is only for reporting reactions, and VAERS staff members do not give medical advice. 6. The National Vaccine Injury Compensation Program    The [facility] Vaccine Injury Compensation Program (VICP) is a federal program that was created to compensate people who may have been injured by certain vaccines. Claims regarding alleged injury or death due to vaccination have a time limit for filing, which may be as short as two years. Visit the VICP website at www.[facility].gov/vaccinecompensation or call 9-252.210.2692 to learn about the program and about filing a claim. 7. How can I learn more? Ask your health care provider. Call your local or state health department. Visit the website of the Food and Drug Administration (FDA) for vaccine package inserts and additional information at www.fda.gov/vaccines-blood-biologics/vaccines. Contact the Centers for Disease Control and Prevention (CDC): Call 9-745.338.2375 (1-800-CDC-INFO) or  Visit CDCs influenza website at www.cdc.gov/flu. Vaccine Information Statement   Inactivated Influenza Vaccine   8/6/2021  [address]argas[address] Varghese [id number]   Department of Health and Human Services  Centers for Disease Control and Prevention    Office Use Only

## 2022-12-12 NOTE — PROGRESS NOTES
Chief Complaint   Patient presents with    Medication Evaluation     4 week follow-up      Health Maintenance reviewed     1. Have you been to the ER, urgent care clinic since your last visit? Hospitalized since your last visit? No     2. Have you seen or consulted any other health care providers outside of the [address] [place] [address] since your last visit? Include any pap smears or colon screening.   No

## 2023-03-02 DIAGNOSIS — K50.111 CROHN'S DISEASE OF LARGE INTESTINE WITH RECTAL BLEEDING (HCC): ICD-10-CM

## 2023-03-02 DIAGNOSIS — F45.41 STRESS HEADACHES: ICD-10-CM

## 2023-03-02 DIAGNOSIS — K51.919 ULCERATIVE COLITIS WITH COMPLICATION, UNSPECIFIED LOCATION (HCC): ICD-10-CM

## 2023-03-02 DIAGNOSIS — K21.9 GASTROESOPHAGEAL REFLUX DISEASE, UNSPECIFIED WHETHER ESOPHAGITIS PRESENT: ICD-10-CM

## 2023-03-02 RX ORDER — OMEPRAZOLE 40 MG/1
40 CAPSULE, DELAYED RELEASE ORAL DAILY
Qty: 90 CAPSULE | Refills: 1 | Status: SHIPPED | OUTPATIENT
Start: 2023-03-02

## 2023-03-02 RX ORDER — AMITRIPTYLINE HYDROCHLORIDE 25 MG/1
TABLET, FILM COATED ORAL
Qty: 90 TABLET | Refills: 1 | Status: SHIPPED | OUTPATIENT
Start: 2023-03-02

## 2023-03-08 ENCOUNTER — VIRTUAL VISIT (OUTPATIENT)
Dept: FAMILY MEDICINE CLINIC | Age: 53
End: 2023-03-08

## 2023-03-08 DIAGNOSIS — K92.1 BLOOD IN THE STOOL: ICD-10-CM

## 2023-03-08 DIAGNOSIS — R15.9 INCONTINENCE OF FECES, UNSPECIFIED FECAL INCONTINENCE TYPE: Primary | ICD-10-CM

## 2023-03-08 DIAGNOSIS — R32 URINARY INCONTINENCE, UNSPECIFIED TYPE: ICD-10-CM

## 2023-03-08 NOTE — PROGRESS NOTES
Chief Complaint   Patient presents with    Incontinence     Health Maintenance reviewed     1. Have you been to the ER, urgent care clinic since your last visit? Hospitalized since your last visit? No     2. Have you seen or consulted any other health care providers outside of the [address] [place] [address] since your last visit? Include any pap smears or colon screening.   No

## 2023-03-08 NOTE — PROGRESS NOTES
Carrie Khoury is a 45 y.o. female, evaluated via audio-only technology on 3/8/2023 for Incontinence  Virtual video visit was changed to audio visit due to patient not being able to connect to video visit. Assessment & Plan:   Diagnoses and all orders for this visit:    1. Incontinence of feces, unspecified fecal incontinence type  Follow-up with Dr. Leigha Arcos, GI for incontinence. Will send form for incontinence supplies. 2. Urinary incontinence, unspecified type  Continue with kegel exercise. Can consider pelvic floor therapy. 3. Blood in the stool  Follow-up with Dr. Leigha Arcos next week for blood in stool. Discussed red flag symptoms that warrant ED visit between now and seeing Dr. Leigha Arcos. The complexity of medical decision making for this visit is moderate       12  Subjective:   Patient is being seen today for stool incontinence and she needs incontinence supply form filled out. We have never discussed this so wanted to discuss this further with her so that it is now documented in her chart that she has this problem. She has a stroke in 2018 and ever since her stroke she has had incontinence of stool everyday. She will intermittently have urinary incontinence but majority of the time it is stool. She has to wear brief daily because she has leakage of stool. She is able to feel when she has to have BM but by the time she can go to the restroom stool has already leaked out or she had a complete BM. Patient denies feeling constipated. Denies any skin breakdown due to incontinence. She tries to do kegel exercises but this has not provided resolution. She has an appointment with Dr. Leigha Arcos her GI provider and plans to speak with him regarding the incontinence. Blood in stool:   Today she also mentions she had bloody clots in her BM yesterday. No blood BM today. She has a history of UC and Crohn's and usually this is well managed with diet.    She has an appointment with her [facility] provider Dr. Bashir Middleton on 3/15/2023 to follow-up on her blood BM. Denies any dizziness, diaphoresis, vision changes, LOC, chest pain, SOB, N/V/D, constipation, abdominal pain. She is eating and drinking normally. Prior to Admission medications    Medication Sig Start Date End Date Taking? Authorizing Provider   amitriptyline (ELAVIL) 25 mg tablet TAKE 1 TABLET BY MOUTH EVERY NIGHT 3/2/23  Yes Janusz Quiroz NP   omeprazole (PRILOSEC) 40 mg capsule TAKE 1 CAPSULE BY MOUTH DAILY 3/2/23  Yes Janusz Quiroz NP   venlafaxine-SR [facility] 75 mg capsule TAKE ONE CAPSULE BY MOUTH DAILY 1/11/23  Yes Janusz Quiroz NP   nystatin (MYCOSTATIN) topical cream Apply  to affected area two (2) times a day. 12/2/22  Yes Janusz Quiroz NP   atorvastatin (LIPITOR) 20 mg tablet Take 1 Tablet by mouth nightly. 11/21/22  Yes Janusz Quiroz NP   aspirin delayed-release 325 mg tablet Take 1 Tablet by mouth daily. 11/21/22  Yes Janusz Quiroz NP     Patient Active Problem List   Diagnosis Code    Fibromyalgia M79.7    Crohn's disease of large intestine with rectal bleeding ([facility] K50.111    Ulcerative colitis ([facility] K51.90    Gastritis K29.70    Anxiety and depression F41.9, F32. 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 10/23/2018    Left hemiparesis ([facility] 10/23/2018    Anti-TPO antibodies present 07/09/2018    Tobacco use 09/01/2017    Inflammatory bowel diseases (IBD) 09/01/2017    Thyroid nodule 10/21/2016    Crohn's disease of large intestine with rectal bleeding ([address] 07/14/2016    Ulcerative colitis ([facility] 07/14/2016    Gastritis 07/14/2016    Reaction to QuantiFERON-TB test 05/12/2015    Intermediate TPMT enzyme activity ([facility] 04/29/2015    Crohn's disease ([facility] 04/22/2015    Radial styloid tenosynovitis 03/09/2015    De Quervain's tenosynovitis, bilateral 03/09/2015    Patellofemoral syndrome of both knees 03/09/2015    Anxiety and depression 05/03/2013    Intestinal disorder 05/03/2013    Fibromyalgia 06/28/2011     Current Outpatient Medications   Medication Sig Dispense Refill    amitriptyline (ELAVIL) 25 mg tablet TAKE 1 TABLET BY MOUTH EVERY NIGHT 90 Tablet 1    omeprazole (PRILOSEC) 40 mg capsule TAKE 1 CAPSULE BY MOUTH DAILY 90 Capsule 1    venlafaxine-SR (EFFEXOR-XR) 75 mg capsule TAKE ONE CAPSULE BY MOUTH DAILY 90 Capsule 0    nystatin (MYCOSTATIN) topical cream Apply  to affected area two (2) times a day. 15 g 0    atorvastatin (LIPITOR) 20 mg tablet Take 1 Tablet by mouth nightly. 90 Tablet 0    aspirin delayed-release 325 mg tablet Take 1 Tablet by mouth daily.  30 Tablet 5     Allergies   Allergen Reactions    Infliximab Other (comments)     Throat closes up per pt  Other reaction(s): THROAT SWELLING    Mesalamine Other (comments)    Adalimumab Rash     Other reaction(s): ITCHING     Past Medical History:   Diagnosis Date    Arthritis     neck, back, hips & knees    Cerebrovascular accident (CVA) due to embolism of precerebral artery ([address] 10/23/2018    Chronic fatigue     Colitis     Crohn's colitis ([facility]     De Quervain's tenosynovitis, bilateral     Depression     Fibromyalgia     Patellofemoral syndrome of both knees     Pedal edema     Radial styloid fracture      Past Surgical History:   Procedure Laterality Date    HX WISDOM TEETH EXTRACTION      MD COLSC FLX W/NDSC US XM RCTM ET AL LMTD&ADJ STRUX       Family History   Problem Relation Age of Onset    Hypertension Mother     Psoriasis Mother     OSTEOARTHRITIS Mother     Lung Disease Mother     Cancer Father     Crohn's Disease Father     Alcohol abuse Father         Gene    Crohn's Disease Brother     Asthma Brother     Cancer Paternal Aunt     Cancer Paternal Uncle     Lung Disease Maternal Grandmother      Social History     Tobacco Use    Smoking status: Light Smoker     Packs/day: 1.00     Years: 15.00     Pack years: 15.00     Types: Cigarettes    Smokeless tobacco: Never    Tobacco comments:     quit 9/3/18   Substance Use Topics    Alcohol use: No       Review of Systems   Constitutional:  Negative for chills, diaphoresis, fever, malaise/fatigue and weight loss. Eyes: Negative. Respiratory:  Negative for cough and shortness of breath. Cardiovascular:  Negative for chest pain, palpitations and leg swelling. Gastrointestinal:  Positive for blood in stool. Negative for abdominal pain, constipation, diarrhea, heartburn, melena, nausea and vomiting.        +incontinence of stool    Genitourinary:  Negative for dysuria, flank pain, frequency, hematuria and urgency. +intermittent incontinence of urine    Neurological:  Negative for dizziness, tingling, tremors, sensory change, speech change, focal weakness, seizures, loss of consciousness, weakness and headaches. Patient-Reported Weight: 188 lbs       Karla Friend was evaluated through a patient-initiated, synchronous (real-time) audio only encounter. She (or guardian if applicable) is aware that it is a billable service, which includes applicable co-pays, with coverage as determined by her insurance carrier. This visit was conducted with the patient's (and/or Reshma Tatum guardian's) verbal consent.  She has not had a related appointment within my department in the past 7 days or scheduled within the next 24 hours. The patient was located in a state where the provider was licensed to provide care. The patient was located at: Home: [address] [id number]  The provider was located at:  Facility (Appt Department): [address]    Total Time: minutes: 5-10 minutes    Emy Moy NP

## 2023-07-11 RX ORDER — ASPIRIN 325 MG
TABLET, DELAYED RELEASE (ENTERIC COATED) ORAL
Qty: 30 TABLET | Refills: 1 | Status: SHIPPED | OUTPATIENT
Start: 2023-07-11

## 2023-08-25 NOTE — TELEPHONE ENCOUNTER
Please advise her I unable to prescribe birth control. She is low risk for pregnancy, but needs to prevent STI.  See  gyn referral negative normal/soft/nontender/nondistended/normal active bowel sounds

## 2023-09-05 RX ORDER — ASPIRIN 325 MG
TABLET, DELAYED RELEASE (ENTERIC COATED) ORAL
Qty: 30 TABLET | Refills: 1 | Status: SHIPPED | OUTPATIENT
Start: 2023-09-05

## 2023-10-03 RX ORDER — AMITRIPTYLINE HYDROCHLORIDE 25 MG/1
25 TABLET, FILM COATED ORAL
Qty: 90 TABLET | Refills: 2 | Status: SHIPPED | OUTPATIENT
Start: 2023-10-03

## 2023-10-15 RX ORDER — VENLAFAXINE HYDROCHLORIDE 75 MG/1
CAPSULE, EXTENDED RELEASE ORAL DAILY
Qty: 90 CAPSULE | Refills: 1 | Status: SHIPPED | OUTPATIENT
Start: 2023-10-15

## 2023-11-02 RX ORDER — ASPIRIN 325 MG
TABLET, DELAYED RELEASE (ENTERIC COATED) ORAL
Qty: 30 TABLET | Refills: 1 | Status: SHIPPED | OUTPATIENT
Start: 2023-11-02

## 2023-12-11 RX ORDER — ASPIRIN 325 MG
325 TABLET, DELAYED RELEASE (ENTERIC COATED) ORAL DAILY
Qty: 90 TABLET | Refills: 3 | Status: SHIPPED | OUTPATIENT
Start: 2023-12-11

## 2024-04-04 RX ORDER — VENLAFAXINE HYDROCHLORIDE 75 MG/1
75 CAPSULE, EXTENDED RELEASE ORAL DAILY
Qty: 90 CAPSULE | Refills: 0 | Status: SHIPPED | OUTPATIENT
Start: 2024-04-04

## 2024-04-04 NOTE — TELEPHONE ENCOUNTER
Subject: Refill Request     QUESTIONS   Name of Medication? venlafaxine (EFFEXOR XR) 75 MG extended release   capsule   Patient-reported dosage and instructions? 75mg   How many days do you have left? 0   [facility]   Pharmacy phone number (if available)? [phone]   ---------------------------------------------------------------------------   --------------   CALL BACK INFO   What is the best way for the office to contact you? OK to leave message on   voicemail   Preferred Call Back Phone Number? [phone]   ---------------------------------------------------------------------------   --------------   SCRIPT ANSWERS   Relationship to Patient? Covered Entity   Covered Entity Type? Pharmacy?   Representative Name? Linette

## 2024-04-09 ENCOUNTER — TELEPHONE (OUTPATIENT)
Age: 54
End: 2024-04-09

## 2024-04-09 NOTE — TELEPHONE ENCOUNTER
Unable to reach pt to inform per provider appt needed for [facility] medical assistance services form to be filled out. Left voicemail to call back.

## 2024-05-01 ENCOUNTER — TELEPHONE (OUTPATIENT)
Age: 54
End: 2024-05-01

## 2024-05-02 ENCOUNTER — TELEPHONE (OUTPATIENT)
Age: 54
End: 2024-05-02

## 2024-05-02 NOTE — TELEPHONE ENCOUNTER
Patient is calling to check the status of a request that was faxed to us from [facility] for patients supplies. Patient is anxious to get her monthly order in.

## 2024-09-05 ENCOUNTER — OFFICE VISIT (OUTPATIENT)
Age: 54
End: 2024-09-05
Payer: MEDICAID

## 2024-09-05 VITALS
HEART RATE: 74 BPM | BODY MASS INDEX: 32.78 KG/M2 | WEIGHT: 192 LBS | TEMPERATURE: 98.2 F | HEIGHT: 64 IN | DIASTOLIC BLOOD PRESSURE: 83 MMHG | RESPIRATION RATE: 16 BRPM | OXYGEN SATURATION: 97 % | SYSTOLIC BLOOD PRESSURE: 124 MMHG

## 2024-09-05 DIAGNOSIS — Z11.4 SCREENING FOR HIV WITHOUT PRESENCE OF RISK FACTORS: ICD-10-CM

## 2024-09-05 DIAGNOSIS — Z76.89 ENCOUNTER TO ESTABLISH CARE WITH NEW DOCTOR: Primary | ICD-10-CM

## 2024-09-05 DIAGNOSIS — Z12.31 ENCOUNTER FOR SCREENING MAMMOGRAM FOR BREAST CANCER: ICD-10-CM

## 2024-09-05 DIAGNOSIS — F32.A ANXIETY AND DEPRESSION: ICD-10-CM

## 2024-09-05 DIAGNOSIS — E78.5 HYPERLIPIDEMIA, UNSPECIFIED HYPERLIPIDEMIA TYPE: ICD-10-CM

## 2024-09-05 DIAGNOSIS — Z86.73 HISTORY OF STROKE: ICD-10-CM

## 2024-09-05 DIAGNOSIS — K50.919 CROHN'S DISEASE WITH COMPLICATION, UNSPECIFIED GASTROINTESTINAL TRACT LOCATION (HCC): ICD-10-CM

## 2024-09-05 DIAGNOSIS — F41.9 ANXIETY AND DEPRESSION: ICD-10-CM

## 2024-09-05 PROCEDURE — 99214 OFFICE O/P EST MOD 30 MIN: CPT | Performed by: NURSE PRACTITIONER

## 2024-09-05 RX ORDER — VENLAFAXINE HYDROCHLORIDE 75 MG/1
75 CAPSULE, EXTENDED RELEASE ORAL DAILY
Qty: 90 CAPSULE | Refills: 1 | Status: SHIPPED | OUTPATIENT
Start: 2024-09-05

## 2024-09-05 RX ORDER — ASPIRIN 81 MG/1
81 TABLET ORAL DAILY
Qty: 90 TABLET | Refills: 0 | Status: SHIPPED | OUTPATIENT
Start: 2024-09-05

## 2024-09-05 RX ORDER — ATORVASTATIN CALCIUM 40 MG/1
40 TABLET, FILM COATED ORAL NIGHTLY
Qty: 30 TABLET | Refills: 1 | Status: SHIPPED | OUTPATIENT
Start: 2024-09-05 | End: 2024-11-04

## 2024-09-05 RX ORDER — PREDNISONE 20 MG/1
20 TABLET ORAL DAILY
COMMUNITY

## 2024-09-05 SDOH — ECONOMIC STABILITY: INCOME INSECURITY: HOW HARD IS IT FOR YOU TO PAY FOR THE VERY BASICS LIKE FOOD, HOUSING, MEDICAL CARE, AND HEATING?: NOT HARD AT ALL

## 2024-09-05 SDOH — ECONOMIC STABILITY: FOOD INSECURITY: WITHIN THE PAST 12 MONTHS, THE FOOD YOU BOUGHT JUST DIDN'T LAST AND YOU DIDN'T HAVE MONEY TO GET MORE.: NEVER TRUE

## 2024-09-05 SDOH — ECONOMIC STABILITY: FOOD INSECURITY: WITHIN THE PAST 12 MONTHS, YOU WORRIED THAT YOUR FOOD WOULD RUN OUT BEFORE YOU GOT MONEY TO BUY MORE.: NEVER TRUE

## 2024-09-05 ASSESSMENT — ENCOUNTER SYMPTOMS
SHORTNESS OF BREATH: 0
ABDOMINAL PAIN: 0
CHEST TIGHTNESS: 0
ABDOMINAL DISTENTION: 0
ANAL BLEEDING: 0

## 2024-09-05 ASSESSMENT — PATIENT HEALTH QUESTIONNAIRE - PHQ9
9. THOUGHTS THAT YOU WOULD BE BETTER OFF DEAD, OR OF HURTING YOURSELF: NOT AT ALL
2. FEELING DOWN, DEPRESSED OR HOPELESS: NEARLY EVERY DAY
SUM OF ALL RESPONSES TO PHQ9 QUESTIONS 1 & 2: 3
4. FEELING TIRED OR HAVING LITTLE ENERGY: NEARLY EVERY DAY
7. TROUBLE CONCENTRATING ON THINGS, SUCH AS READING THE NEWSPAPER OR WATCHING TELEVISION: NOT AT ALL
SUM OF ALL RESPONSES TO PHQ QUESTIONS 1-9: 8
5. POOR APPETITE OR OVEREATING: SEVERAL DAYS
6. FEELING BAD ABOUT YOURSELF - OR THAT YOU ARE A FAILURE OR HAVE LET YOURSELF OR YOUR FAMILY DOWN: SEVERAL DAYS
10. IF YOU CHECKED OFF ANY PROBLEMS, HOW DIFFICULT HAVE THESE PROBLEMS MADE IT FOR YOU TO DO YOUR WORK, TAKE CARE OF THINGS AT HOME, OR GET ALONG WITH OTHER PEOPLE: SOMEWHAT DIFFICULT
8. MOVING OR SPEAKING SO SLOWLY THAT OTHER PEOPLE COULD HAVE NOTICED. OR THE OPPOSITE, BEING SO FIGETY OR RESTLESS THAT YOU HAVE BEEN MOVING AROUND A LOT MORE THAN USUAL: NOT AT ALL
SUM OF ALL RESPONSES TO PHQ QUESTIONS 1-9: 8
SUM OF ALL RESPONSES TO PHQ QUESTIONS 1-9: 8
1. LITTLE INTEREST OR PLEASURE IN DOING THINGS: NOT AT ALL
SUM OF ALL RESPONSES TO PHQ QUESTIONS 1-9: 8
3. TROUBLE FALLING OR STAYING ASLEEP: NOT AT ALL

## 2024-09-05 NOTE — PATIENT INSTRUCTIONS
For your Pap smear, cervical screening and or family-planning needs, I recommend the following: Please call to schedule ASAP    [facility]  [address] [place] [address] [phone]  [email or web address]    Last pap was 6/18/18

## 2024-09-05 NOTE — PROGRESS NOTES
Chief Complaint   Patient presents with    Follow-up    Depression     New to provider         Health Maintenance Due   Topic Date Due    HIV screen  Never done    Pneumococcal 0-64 years Vaccine (2 of 2 - PCV) 04/24/2016    Shingles vaccine (1 of 2) Never done    Hepatitis B vaccine (3 of 3 - 19+ 3-dose series) 12/16/2019    Breast cancer screen  11/06/2020    Cervical cancer screen  06/18/2023    Lipids  11/22/2023    Depression Monitoring  03/08/2024    DTaP/Tdap/Td vaccine (2 - Td or Tdap) 04/21/2024    Flu vaccine (1) 08/01/2024    COVID-19 Vaccine (1 - 2023-24 season) Never done         \"Have you been to the ER, urgent care clinic since your last visit?  Hospitalized since your last visit?\"    NO    “Have you seen or consulted any other health care providers outside of [facility] since your last visit?”    GI       Have you had a mammogram?”   NO    Date of last Mammogram: 11/6/2018      “Have you had a pap smear?”    NO    Date of last Cervical Cancer screen (HPV or PAP): 6/18/2018         
Extraocular Movements: Extraocular movements intact.      Conjunctiva/sclera: Conjunctivae normal.      Pupils: Pupils are equal, round, and reactive to light.   Cardiovascular:      Rate and Rhythm: Normal rate and regular rhythm.      Pulses: Normal pulses.      Heart sounds: Normal heart sounds.   Pulmonary:      Effort: Pulmonary effort is normal.      Breath sounds: Normal breath sounds.   Abdominal:      General: Bowel sounds are normal.      Palpations: Abdomen is soft.   Musculoskeletal:      Cervical back: Normal range of motion.      Comments: Absent ROM to left upper extremity related to stroke.  Left lower extremity weakness related to right CVA   Skin:     General: Skin is warm and dry.   Neurological:      General: No focal deficit present.      Mental Status: She is alert and oriented to person, place, and time. Mental status is at baseline.      Sensory: No sensory deficit.      Motor: Weakness present.      Gait: Gait abnormal.   Psychiatric:         Mood and Affect: Mood normal.         Behavior: Behavior normal.         Thought Content: Thought content normal.         Judgment: Judgment normal.             Assessment/Plan:     Assessment & Plan  Encounter to establish care with new doctor       Orders:    CBC with Auto Differential; Future    Comprehensive Metabolic Panel; Future    TSH; Future    Vitamin D 25 Hydroxy; Future    Hemoglobin A1C; Future    Crohn's disease with complication, unspecified gastrointestinal tract location (HCC)  Controlled  Continue to follow-up with gastroenterologist and take medication/prednisone as prescribed          Anxiety and depression  Controlled with medication  Will need depression screening at next visit     Orders:    amitriptyline (ELAVIL) 25 MG tablet; Take 1 tablet by mouth nightly    venlafaxine (EFFEXOR XR) 75 MG extended release capsule; Take 1 capsule by mouth daily    Hyperlipidemia, unspecified hyperlipidemia type  Uncontrolled   Increased Lipitor

## 2024-09-05 NOTE — ASSESSMENT & PLAN NOTE
Controlled  Continue to follow-up with gastroenterologist and take medication/prednisone as prescribed

## 2024-09-05 NOTE — ASSESSMENT & PLAN NOTE
Controlled with medication  Will need depression screening at next visit     Orders:    amitriptyline (ELAVIL) 25 MG tablet; Take 1 tablet by mouth nightly    venlafaxine (EFFEXOR XR) 75 MG extended release capsule; Take 1 capsule by mouth daily

## 2024-09-07 LAB
25(OH)D3+25(OH)D2 SERPL-MCNC: 22 NG/ML (ref 30–100)
ALBUMIN SERPL-MCNC: 4.3 G/DL (ref 3.8–4.9)
ALP SERPL-CCNC: 90 IU/L (ref 44–121)
ALT SERPL-CCNC: 12 IU/L (ref 0–32)
AST SERPL-CCNC: 14 IU/L (ref 0–40)
BASOPHILS # BLD AUTO: 0.1 X10E3/UL (ref 0–0.2)
BASOPHILS NFR BLD AUTO: 1 %
BILIRUB SERPL-MCNC: 0.5 MG/DL (ref 0–1.2)
BUN SERPL-MCNC: 11 MG/DL (ref 6–24)
BUN/CREAT SERPL: 12 (ref 9–23)
CALCIUM SERPL-MCNC: 9.7 MG/DL (ref 8.7–10.2)
CHLORIDE SERPL-SCNC: 106 MMOL/L (ref 96–106)
CHOLEST SERPL-MCNC: 166 MG/DL (ref 100–199)
CO2 SERPL-SCNC: 24 MMOL/L (ref 20–29)
CREAT SERPL-MCNC: 0.95 MG/DL (ref 0.57–1)
EGFRCR SERPLBLD CKD-EPI 2021: 71 ML/MIN/1.73
EOSINOPHIL # BLD AUTO: 0 X10E3/UL (ref 0–0.4)
EOSINOPHIL NFR BLD AUTO: 0 %
ERYTHROCYTE [DISTWIDTH] IN BLOOD BY AUTOMATED COUNT: 13.8 % (ref 11.7–15.4)
GLOBULIN SER CALC-MCNC: 2.6 G/DL (ref 1.5–4.5)
GLUCOSE SERPL-MCNC: 78 MG/DL (ref 70–99)
HBA1C MFR BLD: 5.4 % (ref 4.8–5.6)
HCT VFR BLD AUTO: 44.1 % (ref 34–46.6)
HDLC SERPL-MCNC: 68 MG/DL
HGB BLD-MCNC: 14.7 G/DL (ref 11.1–15.9)
HIV 1+2 AB+HIV1 P24 AG SERPL QL IA: NON REACTIVE
IMM GRANULOCYTES # BLD AUTO: 0.1 X10E3/UL (ref 0–0.1)
IMM GRANULOCYTES NFR BLD AUTO: 1 %
IMP & REVIEW OF LAB RESULTS: NORMAL
LDLC SERPL CALC-MCNC: 75 MG/DL (ref 0–99)
LYMPHOCYTES # BLD AUTO: 3.5 X10E3/UL (ref 0.7–3.1)
LYMPHOCYTES NFR BLD AUTO: 32 %
MCH RBC QN AUTO: 29.2 PG (ref 26.6–33)
MCHC RBC AUTO-ENTMCNC: 33.3 G/DL (ref 31.5–35.7)
MCV RBC AUTO: 88 FL (ref 79–97)
MONOCYTES # BLD AUTO: 0.6 X10E3/UL (ref 0.1–0.9)
MONOCYTES NFR BLD AUTO: 6 %
NEUTROPHILS # BLD AUTO: 6.6 X10E3/UL (ref 1.4–7)
NEUTROPHILS NFR BLD AUTO: 60 %
PLATELET # BLD AUTO: 281 X10E3/UL (ref 150–450)
POTASSIUM SERPL-SCNC: 4.1 MMOL/L (ref 3.5–5.2)
PROT SERPL-MCNC: 6.9 G/DL (ref 6–8.5)
RBC # BLD AUTO: 5.03 X10E6/UL (ref 3.77–5.28)
SODIUM SERPL-SCNC: 146 MMOL/L (ref 134–144)
TRIGL SERPL-MCNC: 131 MG/DL (ref 0–149)
TSH SERPL DL<=0.005 MIU/L-ACNC: 4.97 UIU/ML (ref 0.45–4.5)
VLDLC SERPL CALC-MCNC: 23 MG/DL (ref 5–40)
WBC # BLD AUTO: 10.8 X10E3/UL (ref 3.4–10.8)

## 2024-09-10 LAB — SPECIMEN STATUS REPORT: NORMAL

## 2024-09-11 LAB — T4 FREE SERPL-MCNC: 0.96 NG/DL (ref 0.82–1.77)

## 2024-09-16 ENCOUNTER — TELEPHONE (OUTPATIENT)
Age: 54
End: 2024-09-16

## 2024-09-17 ENCOUNTER — TELEPHONE (OUTPATIENT)
Age: 54
End: 2024-09-17

## 2024-09-17 DIAGNOSIS — F17.210 CIGARETTE NICOTINE DEPENDENCE WITHOUT COMPLICATION: Primary | ICD-10-CM

## 2024-09-17 RX ORDER — NICOTINE 21 MG/24HR
1 PATCH, TRANSDERMAL 24 HOURS TRANSDERMAL EVERY 24 HOURS
Qty: 42 PATCH | Refills: 0 | Status: SHIPPED | OUTPATIENT
Start: 2024-09-17 | End: 2024-10-29

## 2024-10-07 DIAGNOSIS — F41.9 ANXIETY AND DEPRESSION: ICD-10-CM

## 2024-10-07 DIAGNOSIS — F32.A ANXIETY AND DEPRESSION: ICD-10-CM

## 2024-10-08 ENCOUNTER — HOSPITAL ENCOUNTER (OUTPATIENT)
Facility: HOSPITAL | Age: 54
Discharge: HOME | End: 2024-10-11
Payer: MEDICAID

## 2024-10-08 VITALS — BODY MASS INDEX: 32.49 KG/M2 | HEIGHT: 65 IN | WEIGHT: 195 LBS

## 2024-10-08 DIAGNOSIS — Z12.31 VISIT FOR SCREENING MAMMOGRAM: ICD-10-CM

## 2024-10-08 PROCEDURE — 77063 BREAST TOMOSYNTHESIS BI: CPT

## 2024-10-08 NOTE — TELEPHONE ENCOUNTER
Pt states pharmacy did not have prescription. Informed pt I would contact pharmacy to see what issue is. Pharmacy does not open until 10. Will call at 10.    VV is fine

## 2024-10-17 ENCOUNTER — TELEMEDICINE (OUTPATIENT)
Age: 54
End: 2024-10-17
Payer: MEDICAID

## 2024-10-17 DIAGNOSIS — R39.89 BLADDER PAIN: Primary | ICD-10-CM

## 2024-10-17 DIAGNOSIS — R39.89 BLADDER PAIN: ICD-10-CM

## 2024-10-17 PROCEDURE — 99213 OFFICE O/P EST LOW 20 MIN: CPT | Performed by: NURSE PRACTITIONER

## 2024-10-17 ASSESSMENT — ENCOUNTER SYMPTOMS
BACK PAIN: 1
NAUSEA: 0
VOMITING: 0

## 2024-10-17 NOTE — PROGRESS NOTES
Chief Complaint   Patient presents with    Follow-up         Health Maintenance Due   Topic Date Due    Hepatitis B vaccine (3 of 3 - 19+ 3-dose series) 12/16/2019    Cervical cancer screen  06/18/2023    DTaP/Tdap/Td vaccine (2 - Td or Tdap) 04/21/2024    Flu vaccine (1) 08/01/2024    COVID-19 Vaccine (1 - 2023-24 season) Never done         \"Have you been to the ER, urgent care clinic since your last visit?  Hospitalized since your last visit?\"    NO    “Have you seen or consulted any other health care providers outside of [facility] since your last visit?”    NO        “Have you had a pap smear?”    NO    Date of last Cervical Cancer screen (HPV or PAP): 6/18/2018

## 2024-10-17 NOTE — PATIENT INSTRUCTIONS
After you have given your urine sample at [facility] and I received the results to determine if we have a bacteria.  If it is positive for a bacteria I will send in a broad-spectrum antibiotic and once the culture comes back should the antibiotic that I have sent in not work on that particular bacteria then I would call in a another antibiotic.    Please schedule a routine follow-up in 3 months after the new year and holiday season. We can touch base regarding your therapy with gastroenterology.

## 2024-10-17 NOTE — PROGRESS NOTES
Janie Livingston, was evaluated through a synchronous (real-time) audio-video encounter. The patient (or guardian if applicable) is aware that this is a billable service, which includes applicable co-pays. This Virtual Visit was conducted with patient's (and/or legal guardian's) consent. Patient identification was verified, and a caregiver was present when appropriate.   The patient was located at Home: [address] [place] [address] [facility] [address]  Provider was located at Facility (Appt Dept): [address] [facility], [address] [address]  Confirm you are appropriately licensed, registered, or certified to deliver care in the state where the patient is located as indicated above. If you are not or unsure, please re-schedule the visit: Yes, I confirm.     Janie Livingston (:  1970) is a Established patient, presenting virtually for evaluation of the following:      Below is the assessment and plan developed based on review of pertinent history, physical exam, labs, studies, and medications.     Assessment & Plan  Bladder pain  Uncontrolled  Take tylenol for pain, PRN  Drink plenty of fluids   Explained to the patient that unfortunately I need a urine sample on her because even if she has a UTI, I need to make sure I am treating the right bacteria.  Pt going to [facility] near her for U/A with culture reflex  Orders:    UA W/Microscopic, Rfx to Culture ([facility] Default); Future      No follow-ups on file.       Subjective   Thinks she has a UTI. Lower bladder pain, always has back pain, no painful urination, smells like \"sulfer\", no fever, no N/V. Pt always has a sense of urgency since her stroke.    Also of note patient is scheduled to start her skyrizi treatment via GI on 10/21/24.      Review of Systems   Constitutional:  Negative for fever.   Gastrointestinal:  Negative for nausea and vomiting.   Genitourinary:  Positive for pelvic pain and urgency. Negative for difficulty urinating, dysuria, flank pain and

## 2024-10-21 ENCOUNTER — HOSPITAL ENCOUNTER (OUTPATIENT)
Facility: HOSPITAL | Age: 54
Setting detail: INFUSION SERIES
Discharge: HOME | End: 2024-10-21
Payer: MEDICAID

## 2024-10-21 VITALS
SYSTOLIC BLOOD PRESSURE: 136 MMHG | OXYGEN SATURATION: 96 % | RESPIRATION RATE: 18 BRPM | TEMPERATURE: 97.5 F | DIASTOLIC BLOOD PRESSURE: 90 MMHG | HEART RATE: 81 BPM

## 2024-10-21 DIAGNOSIS — K50.111 CROHN'S DISEASE OF LARGE INTESTINE WITH RECTAL BLEEDING (HCC): Primary | ICD-10-CM

## 2024-10-21 PROCEDURE — 2580000003 HC RX 258: Performed by: INTERNAL MEDICINE

## 2024-10-21 PROCEDURE — 6360000002 HC RX W HCPCS: Performed by: INTERNAL MEDICINE

## 2024-10-21 PROCEDURE — 96365 THER/PROPH/DIAG IV INF INIT: CPT

## 2024-10-21 RX ORDER — DEXTROSE MONOHYDRATE 50 MG/ML
5-250 INJECTION, SOLUTION INTRAVENOUS PRN
Status: DISCONTINUED | OUTPATIENT
Start: 2024-10-21 | End: 2024-10-22 | Stop reason: HOSPADM

## 2024-10-21 RX ORDER — DEXTROSE MONOHYDRATE 50 MG/ML
5-250 INJECTION, SOLUTION INTRAVENOUS PRN
OUTPATIENT
Start: 2024-11-18

## 2024-10-21 RX ADMIN — DEXTROSE MONOHYDRATE 600 MG: 50 INJECTION, SOLUTION INTRAVENOUS at 13:34

## 2024-10-21 RX ADMIN — DEXTROSE MONOHYDRATE 25 ML/HR: 50 INJECTION, SOLUTION INTRAVENOUS at 13:01

## 2024-10-21 ASSESSMENT — PAIN SCALES - GENERAL
PAINLEVEL_OUTOF10: 0
PAINLEVEL_OUTOF10: 0

## 2024-10-21 ASSESSMENT — PAIN SCALES - WONG BAKER: WONGBAKER_NUMERICALRESPONSE: NO HURT

## 2024-10-21 NOTE — PROGRESS NOTES
[facility] Peds/Adult Note                       Date: 2024    Name: Janie Livingston    MRN: [id number]         : 1970    1250 Patient arrives for Skyrizi IV Dose 1 of 3 without acute problems. Please see EPIC for complete assessment and education provided.    Vital signs stable throughout and prior to discharge. Patient tolerated procedure well and was discharged without incident.  Patient is aware of next [facility] appointment on 24.       Ms. Livingston's vitals were reviewed prior to and after treatment.   Patient Vitals for the past 12 hrs:   Temp Pulse Resp BP SpO2   10/21/24 1435 -- 81 18 (!) 136/90 --   10/21/24 1251 97.5 °F (36.4 °C) 94 18 (!) 148/93 96 %       Medications given:   Medications Administered         dextrose 5 % solution Admin Date  10/21/2024 Action  New Bag Dose  25 mL/hr Rate  25 mL/hr Route  IntraVENous Documented By  Janie Nobles RN        risankizumab-rzaa (SKYRIZI) 600 mg in dextrose 5 % 100 mL infusion Admin Date  10/21/2024 Action  New Bag Dose  600 mg Rate  120 mL/hr Route  IntraVENous Documented By  Janie Nobles, KANNAN              Ms. Livingston tolerated the treatment and had no complaints.    [facility]. Discharge Instructions provided to patient, patient verbalized understanding.     Future Appointments   Date Time Provider Department Center   2024  1:00 PM PEDS FASTTRACK 1 [facility]   2024  1:00 PM PEDS FASTTRACK 1 [facility]       Janie Nobles RN  2024  3:29 PM

## 2024-11-18 ENCOUNTER — HOSPITAL ENCOUNTER (OUTPATIENT)
Facility: HOSPITAL | Age: 54
Setting detail: INFUSION SERIES
Discharge: HOME | End: 2024-11-18
Payer: MEDICAID

## 2024-11-18 VITALS
HEART RATE: 82 BPM | SYSTOLIC BLOOD PRESSURE: 131 MMHG | RESPIRATION RATE: 18 BRPM | TEMPERATURE: 97.9 F | OXYGEN SATURATION: 98 % | DIASTOLIC BLOOD PRESSURE: 81 MMHG

## 2024-11-18 DIAGNOSIS — K50.111 CROHN'S DISEASE OF LARGE INTESTINE WITH RECTAL BLEEDING (HCC): Primary | ICD-10-CM

## 2024-11-18 PROCEDURE — 96365 THER/PROPH/DIAG IV INF INIT: CPT

## 2024-11-18 PROCEDURE — 6360000002 HC RX W HCPCS: Performed by: INTERNAL MEDICINE

## 2024-11-18 PROCEDURE — 2580000003 HC RX 258: Performed by: INTERNAL MEDICINE

## 2024-11-18 RX ORDER — DEXTROSE MONOHYDRATE 50 MG/ML
5-250 INJECTION, SOLUTION INTRAVENOUS PRN
OUTPATIENT
Start: 2024-12-16

## 2024-11-18 RX ORDER — DEXTROSE MONOHYDRATE 50 MG/ML
5-250 INJECTION, SOLUTION INTRAVENOUS PRN
Status: DISCONTINUED | OUTPATIENT
Start: 2024-11-18 | End: 2024-11-19 | Stop reason: HOSPADM

## 2024-11-18 RX ADMIN — DEXTROSE MONOHYDRATE 600 MG: 50 INJECTION, SOLUTION INTRAVENOUS at 13:46

## 2024-11-18 RX ADMIN — DEXTROSE MONOHYDRATE 50 ML/HR: 50 INJECTION, SOLUTION INTRAVENOUS at 13:41

## 2024-11-18 ASSESSMENT — PAIN SCALES - GENERAL: PAINLEVEL_OUTOF10: 0

## 2024-11-18 NOTE — PROGRESS NOTES
[facility] Peds/Adult Note                       Date: 2024    Name: Janie Livingston    MRN: [id number]         : 1970    1305 Patient arrives for Skyrizi IV Dose 2 of 3 without acute problems. Please see EPIC for complete assessment and education provided.    Vital signs stable throughout and prior to discharge. Patient tolerated procedure well and was discharged without incident.  Patient is aware of next [facility] appointment on 24.        Ms. Livingston's vitals were reviewed prior to and after treatment.   Patient Vitals for the past 12 hrs:   Temp Pulse Resp BP SpO2   24 1455 -- 82 18 131/81 --   24 1307 97.9 °F (36.6 °C) 95 18 (!) 142/87 98 %         Medications given:   Medications Administered         dextrose 5 % solution Admin Date  2024 Action  New Bag Dose  50 mL/hr Rate  50 mL/hr Route  IntraVENous Documented By  Janie Nobles RN        risankizumab-rzaa (SKYRIZI) 600 mg in dextrose 5 % 100 mL infusion Admin Date  2024 Action  New Bag Dose  600 mg Rate  120 mL/hr Route  IntraVENous Documented By  Janie Nobles RN              Ms. Livingston tolerated the treatment and had no complaints.    [facility]. Livingston [facility]. Discharge Instructions provided to patient, patient verbalized understanding.     Future Appointments   Date Time Provider Department Center   2024  1:00 PM PEDS FASTTRACK 1 [facility]       Janie Nobles RN  2024  3:10 PM

## 2024-12-12 RX ORDER — DIPHENHYDRAMINE HYDROCHLORIDE 50 MG/ML
50 INJECTION INTRAMUSCULAR; INTRAVENOUS EVERY 4 HOURS PRN
Status: CANCELLED | OUTPATIENT
Start: 2024-12-12

## 2024-12-12 RX ORDER — ACETAMINOPHEN 325 MG/1
650 TABLET ORAL EVERY 4 HOURS PRN
OUTPATIENT
Start: 2024-12-16

## 2024-12-12 RX ORDER — DIPHENHYDRAMINE HYDROCHLORIDE 50 MG/ML
50 INJECTION INTRAMUSCULAR; INTRAVENOUS EVERY 4 HOURS PRN
OUTPATIENT
Start: 2024-12-16

## 2024-12-12 RX ORDER — ACETAMINOPHEN 325 MG/1
650 TABLET ORAL EVERY 4 HOURS PRN
Status: CANCELLED | OUTPATIENT
Start: 2024-12-12

## 2024-12-16 ENCOUNTER — HOSPITAL ENCOUNTER (OUTPATIENT)
Facility: HOSPITAL | Age: 54
Setting detail: INFUSION SERIES
Discharge: HOME | End: 2024-12-16
Payer: MEDICAID

## 2024-12-16 VITALS
RESPIRATION RATE: 18 BRPM | TEMPERATURE: 97.4 F | OXYGEN SATURATION: 99 % | SYSTOLIC BLOOD PRESSURE: 140 MMHG | DIASTOLIC BLOOD PRESSURE: 89 MMHG | HEART RATE: 84 BPM

## 2024-12-16 DIAGNOSIS — K50.111 CROHN'S DISEASE OF LARGE INTESTINE WITH RECTAL BLEEDING (HCC): Primary | ICD-10-CM

## 2024-12-16 PROCEDURE — 96365 THER/PROPH/DIAG IV INF INIT: CPT

## 2024-12-16 PROCEDURE — 2580000003 HC RX 258: Performed by: INTERNAL MEDICINE

## 2024-12-16 PROCEDURE — 6360000002 HC RX W HCPCS: Performed by: INTERNAL MEDICINE

## 2024-12-16 RX ORDER — DIPHENHYDRAMINE HYDROCHLORIDE 50 MG/ML
50 INJECTION INTRAMUSCULAR; INTRAVENOUS EVERY 4 HOURS PRN
OUTPATIENT
Start: 2024-12-16

## 2024-12-16 RX ORDER — ACETAMINOPHEN 325 MG/1
650 TABLET ORAL EVERY 4 HOURS PRN
OUTPATIENT
Start: 2024-12-16

## 2024-12-16 RX ORDER — DEXTROSE MONOHYDRATE 50 MG/ML
5-250 INJECTION, SOLUTION INTRAVENOUS PRN
Status: DISCONTINUED | OUTPATIENT
Start: 2024-12-16 | End: 2024-12-17 | Stop reason: HOSPADM

## 2024-12-16 RX ORDER — DEXTROSE MONOHYDRATE 50 MG/ML
5-250 INJECTION, SOLUTION INTRAVENOUS PRN
Status: CANCELLED | OUTPATIENT
Start: 2024-12-16

## 2024-12-16 RX ADMIN — DEXTROSE MONOHYDRATE 25 ML/HR: 50 INJECTION, SOLUTION INTRAVENOUS at 13:04

## 2024-12-16 RX ADMIN — DEXTROSE MONOHYDRATE 600 MG: 50 INJECTION, SOLUTION INTRAVENOUS at 13:32

## 2024-12-16 ASSESSMENT — PAIN SCALES - GENERAL: PAINLEVEL_OUTOF10: 0

## 2024-12-16 NOTE — PROGRESS NOTES
[facility] Peds/Adult Note                       Date: 2024    Name: Janie Livingston    MRN: [id number]         : 1970    1255 Patient arrives for Skyrizi Dose 3 of 3 without acute problems. Please see EPIC for complete assessment and education provided.    Vital signs stable throughout and prior to discharge. Patient tolerated procedure well and was discharged without incident.  Patient is aware of no future [facility] appointments.     Ms. Livingston's vitals were reviewed prior to and after treatment.   Patient Vitals for the past 12 hrs:   Temp Pulse Resp BP SpO2   24 1439 -- 84 18 (!) 140/89 --   24 1255 97.4 °F (36.3 °C) 91 18 (!) 150/83 99 %       Medications given:   Medications Administered         dextrose 5 % solution Admin Date  2024 Action  New Bag Dose  25 mL/hr Rate  25 mL/hr Route  IntraVENous Documented By  Janie Nobles RN        risankizumab-rzaa (SKYRIZI) 600 mg in dextrose 5 % 100 mL infusion Admin Date  2024 Action  New Bag Dose  600 mg Rate  120 mL/hr Route  IntraVENous Documented By  Janie Nobles RN              Ms. Livingston tolerated the treatment and had no complaints.    [facility]. Livingston [facility]. Discharge Instructions provided to patient, patient verbalized understanding.     No future appointments.    Janie Nobles RN  2024  2:55 PM

## 2024-12-31 ENCOUNTER — TELEPHONE (OUTPATIENT)
Age: 54
End: 2024-12-31

## 2024-12-31 NOTE — TELEPHONE ENCOUNTER
Patient is switching to [facility] pharmacy. [facility] is requesting new prescriptions for her Amitriptyline and Aspirin medications.  [phone]. Fax number for pharmacy [phone].

## 2025-01-02 DIAGNOSIS — Z86.73 HISTORY OF STROKE: ICD-10-CM

## 2025-01-02 DIAGNOSIS — F41.9 ANXIETY AND DEPRESSION: ICD-10-CM

## 2025-01-02 DIAGNOSIS — F32.A ANXIETY AND DEPRESSION: ICD-10-CM

## 2025-01-02 RX ORDER — ASPIRIN 81 MG/1
81 TABLET ORAL DAILY
Qty: 90 TABLET | Refills: 0 | Status: SHIPPED | OUTPATIENT
Start: 2025-01-02

## 2025-01-02 NOTE — TELEPHONE ENCOUNTER
I spoke to patient on 25 at 09:02am. Patient verified  and First/Last name. I let patient know that we needed the address verified for [facility] as there are many in the area. Patient confirmed the fax number is the correct address, leading to their [place] Office. I made sure [place] was correct and patient verified. No questions or concerns from patient. I let her know we would send that script as soon as possible.     Please send Prescriptions to the following [facility]:    [address] [place] [address]   [phone]  &  [phone]   Fax- [phone]

## 2025-02-15 DIAGNOSIS — E78.5 HYPERLIPIDEMIA, UNSPECIFIED HYPERLIPIDEMIA TYPE: ICD-10-CM

## 2025-02-17 DIAGNOSIS — E78.5 HYPERLIPIDEMIA, UNSPECIFIED HYPERLIPIDEMIA TYPE: ICD-10-CM

## 2025-02-17 RX ORDER — ATORVASTATIN CALCIUM 40 MG/1
40 TABLET, FILM COATED ORAL NIGHTLY
Qty: 30 TABLET | Refills: 1 | Status: SHIPPED | OUTPATIENT
Start: 2025-02-17 | End: 2025-02-17

## 2025-02-17 RX ORDER — ATORVASTATIN CALCIUM 40 MG/1
40 TABLET, FILM COATED ORAL NIGHTLY
Qty: 90 TABLET | Refills: 1 | Status: SHIPPED | OUTPATIENT
Start: 2025-02-17

## 2025-03-11 DIAGNOSIS — F32.A ANXIETY AND DEPRESSION: ICD-10-CM

## 2025-03-11 DIAGNOSIS — F41.9 ANXIETY AND DEPRESSION: ICD-10-CM

## 2025-03-11 RX ORDER — VENLAFAXINE HYDROCHLORIDE 75 MG/1
75 CAPSULE, EXTENDED RELEASE ORAL DAILY
Qty: 90 CAPSULE | Refills: 1 | OUTPATIENT
Start: 2025-03-11

## 2025-03-11 RX ORDER — VENLAFAXINE HYDROCHLORIDE 75 MG/1
75 CAPSULE, EXTENDED RELEASE ORAL DAILY
Qty: 30 CAPSULE | Refills: 0 | Status: SHIPPED | OUTPATIENT
Start: 2025-03-11 | End: 2025-04-10

## 2025-03-20 DIAGNOSIS — F41.9 ANXIETY AND DEPRESSION: ICD-10-CM

## 2025-03-20 DIAGNOSIS — Z86.73 HISTORY OF STROKE: ICD-10-CM

## 2025-03-20 DIAGNOSIS — F32.A ANXIETY AND DEPRESSION: ICD-10-CM

## 2025-03-20 RX ORDER — ASPIRIN 81 MG/1
81 TABLET, COATED ORAL DAILY
Qty: 90 TABLET | Refills: 0 | Status: SHIPPED | OUTPATIENT
Start: 2025-03-20

## 2025-03-25 SDOH — ECONOMIC STABILITY: FOOD INSECURITY: WITHIN THE PAST 12 MONTHS, YOU WORRIED THAT YOUR FOOD WOULD RUN OUT BEFORE YOU GOT MONEY TO BUY MORE.: SOMETIMES TRUE

## 2025-03-25 SDOH — ECONOMIC STABILITY: FOOD INSECURITY: WITHIN THE PAST 12 MONTHS, THE FOOD YOU BOUGHT JUST DIDN'T LAST AND YOU DIDN'T HAVE MONEY TO GET MORE.: PATIENT DECLINED

## 2025-03-25 SDOH — ECONOMIC STABILITY: INCOME INSECURITY: IN THE LAST 12 MONTHS, WAS THERE A TIME WHEN YOU WERE NOT ABLE TO PAY THE MORTGAGE OR RENT ON TIME?: NO

## 2025-03-25 SDOH — ECONOMIC STABILITY: TRANSPORTATION INSECURITY
IN THE PAST 12 MONTHS, HAS THE LACK OF TRANSPORTATION KEPT YOU FROM MEDICAL APPOINTMENTS OR FROM GETTING MEDICATIONS?: NO

## 2025-03-25 SDOH — ECONOMIC STABILITY: TRANSPORTATION INSECURITY
IN THE PAST 12 MONTHS, HAS LACK OF TRANSPORTATION KEPT YOU FROM MEETINGS, WORK, OR FROM GETTING THINGS NEEDED FOR DAILY LIVING?: NO

## 2025-03-28 ENCOUNTER — OFFICE VISIT (OUTPATIENT)
Age: 55
End: 2025-03-28
Payer: MEDICAID

## 2025-03-28 VITALS
DIASTOLIC BLOOD PRESSURE: 56 MMHG | HEART RATE: 72 BPM | BODY MASS INDEX: 35.53 KG/M2 | RESPIRATION RATE: 16 BRPM | HEIGHT: 60 IN | OXYGEN SATURATION: 97 % | TEMPERATURE: 98.3 F | SYSTOLIC BLOOD PRESSURE: 127 MMHG | WEIGHT: 181 LBS

## 2025-03-28 DIAGNOSIS — M21.372 LEFT FOOT DROP: Primary | Chronic | ICD-10-CM

## 2025-03-28 DIAGNOSIS — F32.A ANXIETY AND DEPRESSION: Chronic | ICD-10-CM

## 2025-03-28 DIAGNOSIS — R79.89 ELEVATED TSH: ICD-10-CM

## 2025-03-28 DIAGNOSIS — N95.1 HOT FLASHES, MENOPAUSAL: Chronic | ICD-10-CM

## 2025-03-28 DIAGNOSIS — F41.9 ANXIETY AND DEPRESSION: Chronic | ICD-10-CM

## 2025-03-28 PROCEDURE — 99214 OFFICE O/P EST MOD 30 MIN: CPT | Performed by: NURSE PRACTITIONER

## 2025-03-28 RX ORDER — RISANKIZUMAB-RZAA 360 MG/2.4
WEARABLE INJECTOR SUBCUTANEOUS
COMMUNITY
Start: 2024-12-23

## 2025-03-28 RX ORDER — VENLAFAXINE HYDROCHLORIDE 75 MG/1
75 CAPSULE, EXTENDED RELEASE ORAL DAILY
Qty: 90 CAPSULE | Refills: 1 | Status: SHIPPED | OUTPATIENT
Start: 2025-03-28 | End: 2025-09-24

## 2025-03-28 SDOH — ECONOMIC STABILITY: FOOD INSECURITY: WITHIN THE PAST 12 MONTHS, THE FOOD YOU BOUGHT JUST DIDN'T LAST AND YOU DIDN'T HAVE MONEY TO GET MORE.: NEVER TRUE

## 2025-03-28 SDOH — ECONOMIC STABILITY: FOOD INSECURITY: WITHIN THE PAST 12 MONTHS, YOU WORRIED THAT YOUR FOOD WOULD RUN OUT BEFORE YOU GOT MONEY TO BUY MORE.: NEVER TRUE

## 2025-03-28 ASSESSMENT — ENCOUNTER SYMPTOMS
NAUSEA: 0
CHEST TIGHTNESS: 0
VOMITING: 0
ABDOMINAL PAIN: 0
SHORTNESS OF BREATH: 0

## 2025-03-28 ASSESSMENT — PATIENT HEALTH QUESTIONNAIRE - PHQ9
SUM OF ALL RESPONSES TO PHQ QUESTIONS 1-9: 0
SUM OF ALL RESPONSES TO PHQ QUESTIONS 1-9: 0
1. LITTLE INTEREST OR PLEASURE IN DOING THINGS: NOT AT ALL
SUM OF ALL RESPONSES TO PHQ QUESTIONS 1-9: 0
2. FEELING DOWN, DEPRESSED OR HOPELESS: NOT AT ALL
SUM OF ALL RESPONSES TO PHQ QUESTIONS 1-9: 0

## 2025-03-28 NOTE — PROGRESS NOTES
Chief Complaint   Patient presents with    Follow-up         Health Maintenance Due   Topic Date Due    Pneumococcal 50+ years Vaccine (2 of 2 - PCV) 04/24/2016    Hepatitis B vaccine (3 of 3 - 19+ 3-dose series) 12/16/2019    Cervical cancer screen  06/18/2023    DTaP/Tdap/Td vaccine (2 - Td or Tdap) 04/21/2024    Flu vaccine (1) 08/01/2024         \"Have you been to the ER, urgent care clinic since your last visit?  Hospitalized since your last visit?\"    NO    “Have you seen or consulted any other health care providers outside of [facility] since your last visit?”    GI        “Have you had a pap smear?”    NO    Date of last Cervical Cancer screen (HPV or PAP): 6/18/2018         
opportunity.    In the past patient's TSH was slightly elevated so we need to continue to monitor at this could also contribute to some hot flashes and the weight gain.  Lab work pending.    Vitals:    03/28/25 1459   BP: (!) 127/56   BP Site: Right Upper Arm   Patient Position: Sitting   BP Cuff Size: Large Adult   Pulse: 72   Resp: 16   Temp: 98.3 °F (36.8 °C)   TempSrc: Temporal   SpO2: 97%   Weight: 82.1 kg (181 lb)   Height: 1.534 m (5' 0.4\")        Past Medical History:   Diagnosis Date    Arthritis     neck, back, hips & knees    Cerebrovascular accident (CVA) due to embolism of precerebral artery (HCC) 10/23/2018    Chronic fatigue     Colitis     Crohn's colitis (HCC)     De Quervain's tenosynovitis, bilateral     Depression     Fibromyalgia     Patellofemoral syndrome of both knees     Pedal edema     Radial styloid fracture        Past Surgical History:   Procedure Laterality Date    COLSC FLX W/NDSC US XM RCTM ET AL LMTD&ADJ STRUX      WISDOM TOOTH EXTRACTION         Current Outpatient Medications   Medication Sig Dispense Refill    SKYRIZI 360 MG/2.4ML SOCT       venlafaxine (EFFEXOR XR) 75 MG extended release capsule Take 1 capsule by mouth daily 90 capsule 1    ASPIRIN LOW DOSE 81 MG EC tablet TAKE 1 TABLET BY MOUTH ONCE A DAY 90 tablet 0    amitriptyline (ELAVIL) 25 MG tablet TAKE 1 TABLET BY MOUTH NIGHTLY. GENERIC ELAVIL 90 tablet 0    atorvastatin (LIPITOR) 40 MG tablet TAKE 1 TABLET BY MOUTH EVERY NIGHT 90 tablet 1     No current facility-administered medications for this visit.       Allergies   Allergen Reactions    Infliximab Other (See Comments)     Throat closes up per pt  Other reaction(s): THROAT SWELLING    Mesalamine Other (See Comments)    Adalimumab Rash     Other reaction(s): ITCHING       Social History     Socioeconomic History    Marital status:      Spouse name: Not on file    Number of children: Not on file    Years of education: Not on file    Highest education level: Not

## 2025-03-29 LAB — T4 FREE SERPL-MCNC: 0.8 NG/DL (ref 0.8–1.5)

## 2025-03-31 ENCOUNTER — RESULTS FOLLOW-UP (OUTPATIENT)
Age: 55
End: 2025-03-31

## 2025-03-31 DIAGNOSIS — R79.89 ELEVATED TSH: Primary | ICD-10-CM

## 2025-04-01 LAB — TSH SERPL DL<=0.05 MIU/L-ACNC: 3.98 UIU/ML (ref 0.36–3.74)

## 2025-04-02 DIAGNOSIS — E78.5 HYPERLIPIDEMIA, UNSPECIFIED HYPERLIPIDEMIA TYPE: ICD-10-CM

## 2025-04-02 RX ORDER — ATORVASTATIN CALCIUM 40 MG/1
40 TABLET, FILM COATED ORAL NIGHTLY
Qty: 90 TABLET | Refills: 1 | Status: SHIPPED | OUTPATIENT
Start: 2025-04-02

## 2025-04-24 ENCOUNTER — PATIENT MESSAGE (OUTPATIENT)
Age: 55
End: 2025-04-24

## 2025-04-24 DIAGNOSIS — Z86.73 HISTORY OF ISCHEMIC STROKE: Primary | ICD-10-CM

## 2025-05-21 ENCOUNTER — TELEPHONE (OUTPATIENT)
Facility: CLINIC | Age: 55
End: 2025-05-21

## 2025-05-21 NOTE — TELEPHONE ENCOUNTER
[facility] Clinical Navigator called to request a referral for surgical consult to be faxed to [facility] Fax #[phone] Dr. Susan Miles.  If you have any question give [facility] a call at [phone].

## 2025-05-22 DIAGNOSIS — I63.9 ISCHEMIC STROKE (HCC): Primary | ICD-10-CM

## 2025-06-16 ENCOUNTER — TELEPHONE (OUTPATIENT)
Age: 55
End: 2025-06-16

## 2025-06-26 DIAGNOSIS — F32.A ANXIETY AND DEPRESSION: ICD-10-CM

## 2025-06-26 DIAGNOSIS — F41.9 ANXIETY AND DEPRESSION: ICD-10-CM

## 2025-06-26 DIAGNOSIS — Z86.73 HISTORY OF STROKE: ICD-10-CM

## 2025-07-01 RX ORDER — ASPIRIN 81 MG/1
81 TABLET, COATED ORAL DAILY
Qty: 90 TABLET | Refills: 0 | Status: SHIPPED | OUTPATIENT
Start: 2025-07-01